# Patient Record
Sex: MALE | Race: WHITE | Employment: OTHER | URBAN - METROPOLITAN AREA
[De-identification: names, ages, dates, MRNs, and addresses within clinical notes are randomized per-mention and may not be internally consistent; named-entity substitution may affect disease eponyms.]

---

## 2017-01-17 RX ORDER — PANTOPRAZOLE SODIUM 40 MG/1
TABLET, DELAYED RELEASE ORAL
Qty: 90 TABLET | Refills: 0 | Status: SHIPPED | OUTPATIENT
Start: 2017-01-17 | End: 2017-04-20

## 2017-04-20 RX ORDER — PANTOPRAZOLE SODIUM 40 MG/1
TABLET, DELAYED RELEASE ORAL
Qty: 90 TABLET | Refills: 0 | Status: SHIPPED | OUTPATIENT
Start: 2017-04-20 | End: 2017-07-20

## 2017-04-23 ENCOUNTER — APPOINTMENT (OUTPATIENT)
Dept: GENERAL RADIOLOGY | Facility: HOSPITAL | Age: 72
End: 2017-04-23
Attending: EMERGENCY MEDICINE
Payer: MEDICARE

## 2017-04-23 ENCOUNTER — HOSPITAL ENCOUNTER (OUTPATIENT)
Age: 72
Discharge: ANOTHER HOSPITAL | End: 2017-04-23
Attending: EMERGENCY MEDICINE
Payer: MEDICARE

## 2017-04-23 ENCOUNTER — APPOINTMENT (OUTPATIENT)
Dept: ULTRASOUND IMAGING | Facility: HOSPITAL | Age: 72
End: 2017-04-23
Attending: EMERGENCY MEDICINE
Payer: MEDICARE

## 2017-04-23 ENCOUNTER — HOSPITAL ENCOUNTER (EMERGENCY)
Facility: HOSPITAL | Age: 72
Discharge: HOME | End: 2017-04-23
Attending: EMERGENCY MEDICINE
Payer: MEDICARE

## 2017-04-23 VITALS
TEMPERATURE: 98 F | HEART RATE: 87 BPM | DIASTOLIC BLOOD PRESSURE: 72 MMHG | OXYGEN SATURATION: 99 % | SYSTOLIC BLOOD PRESSURE: 158 MMHG | RESPIRATION RATE: 20 BRPM

## 2017-04-23 VITALS
DIASTOLIC BLOOD PRESSURE: 74 MMHG | OXYGEN SATURATION: 97 % | BODY MASS INDEX: 24.52 KG/M2 | RESPIRATION RATE: 18 BRPM | TEMPERATURE: 99 F | HEIGHT: 73 IN | WEIGHT: 185 LBS | HEART RATE: 73 BPM | SYSTOLIC BLOOD PRESSURE: 140 MMHG

## 2017-04-23 DIAGNOSIS — R10.13 ABDOMINAL PAIN, EPIGASTRIC: Primary | ICD-10-CM

## 2017-04-23 PROCEDURE — 84484 ASSAY OF TROPONIN QUANT: CPT | Performed by: EMERGENCY MEDICINE

## 2017-04-23 PROCEDURE — 76705 ECHO EXAM OF ABDOMEN: CPT

## 2017-04-23 PROCEDURE — 83690 ASSAY OF LIPASE: CPT | Performed by: EMERGENCY MEDICINE

## 2017-04-23 PROCEDURE — 80076 HEPATIC FUNCTION PANEL: CPT | Performed by: EMERGENCY MEDICINE

## 2017-04-23 PROCEDURE — 85025 COMPLETE CBC W/AUTO DIFF WBC: CPT | Performed by: EMERGENCY MEDICINE

## 2017-04-23 PROCEDURE — 99215 OFFICE O/P EST HI 40 MIN: CPT

## 2017-04-23 PROCEDURE — 36415 COLL VENOUS BLD VENIPUNCTURE: CPT

## 2017-04-23 PROCEDURE — 93005 ELECTROCARDIOGRAM TRACING: CPT

## 2017-04-23 PROCEDURE — 71010 XR CHEST AP PORTABLE  (CPT=71010): CPT

## 2017-04-23 PROCEDURE — 93010 ELECTROCARDIOGRAM REPORT: CPT | Performed by: EMERGENCY MEDICINE

## 2017-04-23 PROCEDURE — 99285 EMERGENCY DEPT VISIT HI MDM: CPT

## 2017-04-23 PROCEDURE — 81003 URINALYSIS AUTO W/O SCOPE: CPT | Performed by: EMERGENCY MEDICINE

## 2017-04-23 PROCEDURE — 80048 BASIC METABOLIC PNL TOTAL CA: CPT | Performed by: EMERGENCY MEDICINE

## 2017-04-23 PROCEDURE — 81002 URINALYSIS NONAUTO W/O SCOPE: CPT

## 2017-04-23 RX ORDER — RANITIDINE 150 MG/1
150 TABLET ORAL EVERY 12 HOURS
Qty: 60 TABLET | Refills: 0 | Status: SHIPPED | OUTPATIENT
Start: 2017-04-23 | End: 2017-05-03

## 2017-04-23 RX ORDER — MAGNESIUM HYDROXIDE/ALUMINUM HYDROXICE/SIMETHICONE 120; 1200; 1200 MG/30ML; MG/30ML; MG/30ML
30 SUSPENSION ORAL ONCE
Status: COMPLETED | OUTPATIENT
Start: 2017-04-23 | End: 2017-04-23

## 2017-04-23 RX ORDER — HEPARIN SODIUM (PORCINE) LOCK FLUSH IV SOLN 100 UNIT/ML 100 UNIT/ML
1 SOLUTION INTRAVENOUS EVERY 8 HOURS PRN
Status: DISCONTINUED | OUTPATIENT
Start: 2017-04-23 | End: 2017-04-23

## 2017-04-23 NOTE — ED PROVIDER NOTES
Patient Seen in: [address]    History   Patient presents with:  Abdomen/Flank Pain (GI/)    Stated Complaint: Abdominal Pain     The history is provided by the patient.      58-year-old male, past medical history signi Tab,  Take by mouth daily. Pantoprazole Sodium (PROTONIX) 40 MG Oral Tab EC,  TAKE 1 TABLET BY MOUTH DAILY   aspirin 325 MG Oral Tab,  Take 325 mg by mouth daily. Vitamin B-1 (VITAMIN B1) 100 MG Oral Tab,  Take 100 mg by mouth daily.    fluorouracil (EF protuberant abdomen, mild distention, exquisitely tender in the epigastric area, minimal right upper quadrant abdominal tenderness, no lower abdominal tenderness. Negative Hardwick's. No palpable masses. Musculoskeletal: Normal range of motion.  He exhibi

## 2017-04-23 NOTE — ED INITIAL ASSESSMENT (HPI)
Pt here with complaints of abdominal pain  That started in the middle of the night today , pt states it gets worse when sitting or lying down, pt states\" he feels like he has a softball in his stomach\"

## 2017-04-24 NOTE — ED PROVIDER NOTES
Patient Seen in: [facility] Emergency Department    History   No chief complaint on file. Stated Complaint: Abdominal Pain    HPI    Patient complains of mid epigastric  abdominal pain that began last night. Pain described as sharp, throbbing. death)   • Diabetes Father    • Heart Disorder Mother      cva   • Stroke Mother 67     cause of death   • Heart Disorder Sister      CAD (cause of death)   • Genito-Urinary Disorder Neg      Urolithiasis         Smoking Status: Never Smoker Ketones Urine Trace (*)     Urobilinogen Urine 2.0 (*)     All other components within normal limits   BASIC METABOLIC PANEL (8) - Abnormal; Notable for the following:     Glucose 111 (*)     All other components within normal limits   HEPATIC FUNCTION PAN mm.  Echogenic liver consistent with fatty infiltration. Right kidney unremarkable. Limited visualization pancreas due to bowel gas. patietn feeling better would like to go home will fu with primary or return to ER if worse.           Disposition and P

## 2017-04-25 ENCOUNTER — TELEPHONE (OUTPATIENT)
Dept: INTERNAL MEDICINE CLINIC | Facility: CLINIC | Age: 72
End: 2017-04-25

## 2017-04-25 NOTE — TELEPHONE ENCOUNTER
Pt was in the ER on Sunday and they told him to follow up with Dr. Damian aHyes within a week, no appts available, declined other doctor, please advise

## 2017-05-01 ENCOUNTER — OFFICE VISIT (OUTPATIENT)
Dept: INTERNAL MEDICINE CLINIC | Facility: CLINIC | Age: 72
End: 2017-05-01

## 2017-05-01 VITALS
HEIGHT: 71 IN | DIASTOLIC BLOOD PRESSURE: 78 MMHG | HEART RATE: 82 BPM | BODY MASS INDEX: 27.18 KG/M2 | TEMPERATURE: 99 F | WEIGHT: 194.13 LBS | RESPIRATION RATE: 20 BRPM | SYSTOLIC BLOOD PRESSURE: 132 MMHG

## 2017-05-01 DIAGNOSIS — E78.2 MIXED HYPERLIPIDEMIA: ICD-10-CM

## 2017-05-01 DIAGNOSIS — I10 ESSENTIAL HYPERTENSION: Primary | ICD-10-CM

## 2017-05-01 DIAGNOSIS — F10.10 ALCOHOL ABUSE: ICD-10-CM

## 2017-05-01 DIAGNOSIS — R10.13 EPIGASTRIC PAIN: ICD-10-CM

## 2017-05-01 PROCEDURE — G0463 HOSPITAL OUTPT CLINIC VISIT: HCPCS | Performed by: INTERNAL MEDICINE

## 2017-05-01 PROCEDURE — 99214 OFFICE O/P EST MOD 30 MIN: CPT | Performed by: INTERNAL MEDICINE

## 2017-05-01 RX ORDER — PANTOPRAZOLE SODIUM 20 MG/1
20 TABLET, DELAYED RELEASE ORAL
COMMUNITY
End: 2017-05-01

## 2017-05-01 RX ORDER — FOLIC ACID 1 MG/1
1 TABLET ORAL
COMMUNITY
End: 2017-05-01

## 2017-05-01 RX ORDER — METOPROLOL SUCCINATE 50 MG/1
50 TABLET, EXTENDED RELEASE ORAL
COMMUNITY
End: 2017-05-01

## 2017-05-01 RX ORDER — DOCUSATE SODIUM 100 MG/1
100 CAPSULE, LIQUID FILLED ORAL
COMMUNITY
End: 2017-11-14

## 2017-05-01 RX ORDER — FERROUS SULFATE 325(65) MG
325 TABLET ORAL
COMMUNITY
End: 2017-11-14

## 2017-05-01 RX ORDER — MELATONIN
100
COMMUNITY
End: 2017-05-01

## 2017-05-01 RX ORDER — ATORVASTATIN CALCIUM 80 MG/1
80 TABLET, FILM COATED ORAL
COMMUNITY
End: 2017-05-01

## 2017-05-01 RX ORDER — FUROSEMIDE 20 MG/1
20 TABLET ORAL
COMMUNITY
End: 2018-04-11

## 2017-05-02 NOTE — PROGRESS NOTES
Fredy Hickman is a 66year old male. HPI:   1. Essential hypertension    Patient has been following low salt diet and has been taking anti-hypertensive prescriptions as prescribed.  Blood pressure has been checked and is under good control at home with fluorouracil (EFUDEX) 5 % Apply Externally Cream  Disp:  Rfl: 0   furosemide (LASIX) 20 MG Oral Tab Take 20 mg by mouth. Disp:  Rfl:    Ferrous Sulfate 325 (65 Fe) MG Oral Tab Take 325 mg by mouth.  Disp:  Rfl:    docusate sodium (COLACE) 100 MG Oral Cap PLAN:   1. Essential hypertension    Patient instructed to take anti-hypertensive medicines exactly as prescribed and to follow a low salt diet as discussed.  Regular exercise at least 3 times weekly will help to curb one's appetite, control weight and lead

## 2017-06-08 ENCOUNTER — LAB ENCOUNTER (OUTPATIENT)
Dept: LAB | Facility: HOSPITAL | Age: 72
End: 2017-06-08
Attending: UROLOGY
Payer: MEDICARE

## 2017-06-08 DIAGNOSIS — R80.9 PROTEINURIA, UNSPECIFIED TYPE: ICD-10-CM

## 2017-06-08 DIAGNOSIS — R97.20 ELEVATED PSA: ICD-10-CM

## 2017-06-08 PROCEDURE — 84154 ASSAY OF PSA FREE: CPT

## 2017-06-08 PROCEDURE — 84153 ASSAY OF PSA TOTAL: CPT

## 2017-06-08 PROCEDURE — 81001 URINALYSIS AUTO W/SCOPE: CPT

## 2017-06-08 PROCEDURE — 36415 COLL VENOUS BLD VENIPUNCTURE: CPT

## 2017-06-12 ENCOUNTER — OFFICE VISIT (OUTPATIENT)
Dept: SURGERY | Facility: CLINIC | Age: 72
End: 2017-06-12

## 2017-06-12 VITALS
HEART RATE: 81 BPM | WEIGHT: 190 LBS | TEMPERATURE: 98 F | BODY MASS INDEX: 26.6 KG/M2 | HEIGHT: 71 IN | SYSTOLIC BLOOD PRESSURE: 118 MMHG | DIASTOLIC BLOOD PRESSURE: 62 MMHG

## 2017-06-12 DIAGNOSIS — R97.20 ELEVATED PSA: Primary | ICD-10-CM

## 2017-06-12 DIAGNOSIS — R35.1 NOCTURIA: ICD-10-CM

## 2017-06-12 DIAGNOSIS — N40.1 BENIGN NON-NODULAR PROSTATIC HYPERPLASIA WITH LOWER URINARY TRACT SYMPTOMS: ICD-10-CM

## 2017-06-12 PROCEDURE — 99214 OFFICE O/P EST MOD 30 MIN: CPT | Performed by: UROLOGY

## 2017-06-12 PROCEDURE — G0463 HOSPITAL OUTPT CLINIC VISIT: HCPCS | Performed by: UROLOGY

## 2017-06-12 NOTE — PATIENT INSTRUCTIONS
Visit in 1 year. Blood draw for PSA--total and free and complete urinalysis urine test before visit.   Please abstain from any and all sexual activity for 5 days before the PSA blood test.

## 2017-06-12 NOTE — PROGRESS NOTES
HPI:    Patient ID: Kristyn Bond is a 66year old male. HPI     1. Nocturia  Patient's AUA score today was 7, mild voiding dysfunction category worse than AUA 4 on chart review (12/12/2016 ).   The patient has urinary frequency less than every 2 hour quickly -- I advised that he should see his PCP, Dr. Diane Thomas if he feels this condition is worsening. Respiratory: Negative for shortness of breath. Cardiovascular: Negative for chest pain. Gastrointestinal: Negative for constipation.    Neurologi TABLET BY MOUTH EVERY DAY Disp: 90 tablet Rfl: 1   LISINOPRIL 10 MG Oral Tab TAKE 1 TABLET(10 MG) BY MOUTH EVERY DAY Disp: 90 tablet Rfl: 1   Atorvastatin Calcium 80 MG Oral Tab Take 1 tablet (80 mg total) by mouth daily.  Disp: 90 tablet Rfl: 3   folic aci improving (most recently 4.1, and was 4.9 about 6-7 months ago). Patient inquired if he needed to continue to monitor this problem and I discussed AUA guidelines for males his age.  I explained that the [address] recommends no annual PSA Karlene Kelly MD.   Electronically Signed: Vic Boyce, 6/12/2017, 12:20 PM.    Carrie Park MD,  personally performed the services described in this documentation.  All medical record entries made by the scribe were at my direction and in my presen

## 2017-06-16 ENCOUNTER — OFFICE VISIT (OUTPATIENT)
Dept: AUDIOLOGY | Facility: CLINIC | Age: 72
End: 2017-06-16

## 2017-06-16 DIAGNOSIS — H90.3 SENSORINEURAL HEARING LOSS, BILATERAL: Primary | ICD-10-CM

## 2017-06-16 PROCEDURE — 92593 HEARING AID CHECK, BOTH EARS: CPT | Performed by: AUDIOLOGIST

## 2017-06-16 NOTE — PROGRESS NOTES
HEARING AID FOLLOW-UP    Woo Barbosa  7/31/1945  [id number]      Pt was seen for a six months follow-up. Aids were in need of cleaning/maintenance. Cleaned and suctioned aids and cShells. Changed Smart Guards.   Completed sanitizing and drying c

## 2017-06-21 ENCOUNTER — OFFICE VISIT (OUTPATIENT)
Dept: AUDIOLOGY | Facility: CLINIC | Age: 72
End: 2017-06-21

## 2017-06-21 DIAGNOSIS — H90.3 SENSORINEURAL HEARING LOSS, BILATERAL: Primary | ICD-10-CM

## 2017-06-21 PROCEDURE — 92593 HEARING AID CHECK, BOTH EARS: CPT | Performed by: AUDIOLOGIST

## 2017-06-21 NOTE — PROGRESS NOTES
HEARING AID FOLLOW-UP    Joe Rodriguez  7/31/1945  [id number]    Patient is here for the [employer] Bundle. Unit was fully charged, paired with each other. Not paired to hearing aids.   When attempting to demonstrate the device using clinic TV, pt c

## 2017-07-06 ENCOUNTER — OFFICE VISIT (OUTPATIENT)
Dept: AUDIOLOGY | Facility: CLINIC | Age: 72
End: 2017-07-06

## 2017-07-06 DIAGNOSIS — H90.3 SENSORINEURAL HEARING LOSS, BILATERAL: Primary | ICD-10-CM

## 2017-07-06 PROCEDURE — V5270 ALD, TV AMPLIFIER, ANY TYPE: HCPCS | Performed by: AUDIOLOGIST

## 2017-07-06 PROCEDURE — 92593 HEARING AID CHECK, BOTH EARS: CPT | Performed by: AUDIOLOGIST

## 2017-07-06 RX ORDER — LISINOPRIL 10 MG/1
TABLET ORAL
Qty: 90 TABLET | Refills: 0 | Status: SHIPPED | OUTPATIENT
Start: 2017-07-06 | End: 2017-10-03

## 2017-07-06 NOTE — PROGRESS NOTES
HEARING AID FOLLOW-UP    Amy Fuentes  7/31/1945  [id number]      Pt was accompanied by his wife. Reviewed hearing aid settings and T-coil function. Dispensed [employer] Bundle. Successfully demonstrated its use.   SN:  [id number]  ERUMKLAFIG

## 2017-07-20 RX ORDER — METOPROLOL SUCCINATE 50 MG/1
TABLET, EXTENDED RELEASE ORAL
Qty: 90 TABLET | Refills: 0 | Status: SHIPPED | OUTPATIENT
Start: 2017-07-20 | End: 2017-10-17

## 2017-07-21 RX ORDER — PANTOPRAZOLE SODIUM 40 MG/1
TABLET, DELAYED RELEASE ORAL
Qty: 90 TABLET | Refills: 0 | Status: SHIPPED | OUTPATIENT
Start: 2017-07-21 | End: 2017-10-17

## 2017-07-26 ENCOUNTER — HOSPITAL ENCOUNTER (OUTPATIENT)
Age: 72
Discharge: HOME | End: 2017-07-26
Attending: FAMILY MEDICINE
Payer: MEDICARE

## 2017-07-26 VITALS
DIASTOLIC BLOOD PRESSURE: 63 MMHG | TEMPERATURE: 99 F | RESPIRATION RATE: 14 BRPM | OXYGEN SATURATION: 98 % | SYSTOLIC BLOOD PRESSURE: 125 MMHG | HEART RATE: 87 BPM

## 2017-07-26 DIAGNOSIS — M70.22 OLECRANON BURSITIS OF LEFT ELBOW: Primary | ICD-10-CM

## 2017-07-26 PROCEDURE — 99212 OFFICE O/P EST SF 10 MIN: CPT

## 2017-07-26 NOTE — ED INITIAL ASSESSMENT (HPI)
Pt here with complaints of a cyst to the left elbow that developed 2 weeks , pt denies any trauma or repetative movements done to that left arm, pt denies any pain or fevers, cyst on left elbow is about a \"golf ball\" size

## 2017-07-26 NOTE — ED PROVIDER NOTES
Patient Seen in: [address]    History   Patient presents with:  Cyst    Stated Complaint: Cyst on left elbow    HPI  67-year-old male presents with 2 weeks of swelling to the left elbow. No injury or trauma. No pain. MOUTH DAILY   furosemide (LASIX) 20 MG Oral Tab,  Take 20 mg by mouth. Ferrous Sulfate 325 (65 Fe) MG Oral Tab,  Take 325 mg by mouth. docusate sodium (COLACE) 100 MG Oral Cap,  Take 100 mg by mouth.    fluorouracil (EFUDEX) 5 % Apply Externally Cream, noted.   Neurological: He is alert and oriented to person, place, and time. Skin: Skin is warm. No rash noted. He is not diaphoretic. No erythema. No pallor. Nursing note and vitals reviewed. MDM     Discussed NSAIDs and compression.   Given patien

## 2017-08-30 ENCOUNTER — TELEPHONE (OUTPATIENT)
Dept: INTERNAL MEDICINE CLINIC | Facility: CLINIC | Age: 72
End: 2017-08-30

## 2017-08-30 NOTE — TELEPHONE ENCOUNTER
Spoke with pt. It is a personal matter regarding his daughter, not pertaining to his medical record. Will pm HADLEYV.

## 2017-09-21 ENCOUNTER — HOSPITAL ENCOUNTER (OUTPATIENT)
Age: 72
Discharge: HOME | End: 2017-09-21
Payer: MEDICARE

## 2017-09-21 VITALS
TEMPERATURE: 99 F | RESPIRATION RATE: 18 BRPM | SYSTOLIC BLOOD PRESSURE: 126 MMHG | HEART RATE: 79 BPM | OXYGEN SATURATION: 98 % | BODY MASS INDEX: 24.5 KG/M2 | DIASTOLIC BLOOD PRESSURE: 72 MMHG | WEIGHT: 175 LBS | HEIGHT: 71 IN

## 2017-09-21 DIAGNOSIS — J02.0 STREP PHARYNGITIS: Primary | ICD-10-CM

## 2017-09-21 DIAGNOSIS — H10.9 CONJUNCTIVITIS OF RIGHT EYE, UNSPECIFIED CONJUNCTIVITIS TYPE: ICD-10-CM

## 2017-09-21 LAB — S PYO AG THROAT QL: POSITIVE

## 2017-09-21 PROCEDURE — 99213 OFFICE O/P EST LOW 20 MIN: CPT

## 2017-09-21 PROCEDURE — 99214 OFFICE O/P EST MOD 30 MIN: CPT

## 2017-09-21 PROCEDURE — 87430 STREP A AG IA: CPT

## 2017-09-21 RX ORDER — AZITHROMYCIN 250 MG/1
TABLET, FILM COATED ORAL
Qty: 1 PACKAGE | Refills: 0 | Status: SHIPPED | OUTPATIENT
Start: 2017-09-21 | End: 2017-09-26

## 2017-09-21 RX ORDER — TOBRAMYCIN 3 MG/ML
2 SOLUTION/ DROPS OPHTHALMIC
Qty: 1 BOTTLE | Refills: 0 | Status: SHIPPED | OUTPATIENT
Start: 2017-09-21 | End: 2017-09-26

## 2017-09-21 NOTE — ED NOTES
All orders complete pt leaving IC stable no acute distress noted RX given and explained pt verbalizes DC and follow up instructions.

## 2017-09-21 NOTE — ED PROVIDER NOTES
Patient Seen in: [address]    History   Patient presents with:  Cough/URI    Stated Complaint: SORE THROAT, RUNNING NOSE, EAR ACHE, HEADACHE    HPI    Patient here with sore throat for  1 week.   No travel,no sick contac acid 1 MG Oral Tab,  Take by mouth daily. Pantoprazole Sodium (PROTONIX) 40 MG Oral Tab EC,  TAKE 1 TABLET BY MOUTH DAILY   aspirin 325 MG Oral Tab,  Take 325 mg by mouth daily. Vitamin B-1 (VITAMIN B1) 100 MG Oral Tab,  Take 100 mg by mouth daily.    f vs. uri    ED Course     Labs Reviewed   [facility] POCT RAPID STREP - Abnormal; Notable for the following:        Result Value    POCT Rapid Strep Positive (*)     All other components within normal limits       MDM     Pt is a 66 yo with strep pharyngitis and R

## 2017-09-21 NOTE — ED NOTES
Redness to right eye also noted pt reports woke up this AM with discharge and crust to eye. Denies visual disturbances.

## 2017-09-21 NOTE — ED INITIAL ASSESSMENT (HPI)
Per pt having cough runny nose and headache for one week. Reports right ear pain for 3 days. Also reports sore throat for one week. Subjective fever.

## 2017-09-27 ENCOUNTER — PATIENT MESSAGE (OUTPATIENT)
Dept: INTERNAL MEDICINE CLINIC | Facility: CLINIC | Age: 72
End: 2017-09-27

## 2017-09-27 NOTE — TELEPHONE ENCOUNTER
From: Megha   To: Mely Marino MD  Sent: 9/27/2017 4:08 PM CDT  Subject: Non-Urgent Medical Question    Hi Dr. Phyllis Tolentino,   My daughter typed up this information regarding my bloody eye and recent cold. Pls let me know your thoughts.  (All

## 2017-09-28 ENCOUNTER — TELEPHONE (OUTPATIENT)
Dept: OTHER | Age: 72
End: 2017-09-28

## 2017-09-28 NOTE — TELEPHONE ENCOUNTER
Trae,  I should see you for these complaints.   Dr NICK    ----- Message -----     From: Sharita Blanco     Sent: 9/27/2017  4:08 PM CDT       To: Juan Borrero MD  Subject: Non-Urgent Medical Question    Hi Dr. Gabriella Aguilar,   My daughter typed up

## 2017-10-04 RX ORDER — LISINOPRIL 10 MG/1
TABLET ORAL
Qty: 90 TABLET | Refills: 0 | Status: SHIPPED | OUTPATIENT
Start: 2017-10-04 | End: 2017-11-14

## 2017-10-04 RX ORDER — ATORVASTATIN CALCIUM 80 MG/1
TABLET, FILM COATED ORAL
Qty: 90 TABLET | Refills: 0 | Status: SHIPPED | OUTPATIENT
Start: 2017-10-04 | End: 2017-11-14

## 2017-10-04 NOTE — TELEPHONE ENCOUNTER
Hypertensive Medications  Protocol Criteria:  · Appointment scheduled in the past 6 months or in the next 3 months  · BMP or CMP in the past 12 months  · Creatinine result < 2  Recent Outpatient Visits            3 months ago Sensorineural hearing loss, bi months  · ALT result < 80  · LDL result <130   Recent Outpatient Visits            3 months ago Sensorineural hearing loss, bilateral    [facility] Angelica Jacobs    Office Visit    3 months ago Sensorineural hearing l

## 2017-10-06 ENCOUNTER — TELEPHONE (OUTPATIENT)
Dept: INTERNAL MEDICINE CLINIC | Facility: CLINIC | Age: 72
End: 2017-10-06

## 2017-10-13 ENCOUNTER — PATIENT OUTREACH (OUTPATIENT)
Dept: INTERNAL MEDICINE CLINIC | Facility: CLINIC | Age: 72
End: 2017-10-13

## 2017-10-17 RX ORDER — PANTOPRAZOLE SODIUM 40 MG/1
TABLET, DELAYED RELEASE ORAL
Qty: 90 TABLET | Refills: 0 | Status: SHIPPED | OUTPATIENT
Start: 2017-10-17 | End: 2017-11-14

## 2017-10-18 ENCOUNTER — OFFICE VISIT (OUTPATIENT)
Dept: INTERNAL MEDICINE CLINIC | Facility: CLINIC | Age: 72
End: 2017-10-18

## 2017-10-18 VITALS
DIASTOLIC BLOOD PRESSURE: 89 MMHG | BODY MASS INDEX: 25.06 KG/M2 | WEIGHT: 179 LBS | RESPIRATION RATE: 16 BRPM | HEART RATE: 68 BPM | HEIGHT: 71 IN | SYSTOLIC BLOOD PRESSURE: 138 MMHG

## 2017-10-18 DIAGNOSIS — IMO0001 COLD: Primary | ICD-10-CM

## 2017-10-18 DIAGNOSIS — F10.10 ALCOHOL ABUSE: ICD-10-CM

## 2017-10-18 DIAGNOSIS — E78.2 MIXED HYPERLIPIDEMIA: ICD-10-CM

## 2017-10-18 DIAGNOSIS — H61.21 IMPACTED CERUMEN OF RIGHT EAR: ICD-10-CM

## 2017-10-18 DIAGNOSIS — H69.81 EUSTACHIAN TUBE DYSFUNCTION, RIGHT: ICD-10-CM

## 2017-10-18 PROBLEM — H69.91 EUSTACHIAN TUBE DYSFUNCTION, RIGHT: Status: ACTIVE | Noted: 2017-10-18

## 2017-10-18 PROCEDURE — 99214 OFFICE O/P EST MOD 30 MIN: CPT | Performed by: INTERNAL MEDICINE

## 2017-10-18 PROCEDURE — 69210 REMOVE IMPACTED EAR WAX UNI: CPT | Performed by: INTERNAL MEDICINE

## 2017-10-18 RX ORDER — METHYLPREDNISOLONE 4 MG/1
TABLET ORAL
Qty: 1 KIT | Refills: 0 | Status: SHIPPED | OUTPATIENT
Start: 2017-10-18 | End: 2017-11-14

## 2017-10-18 RX ORDER — METOPROLOL SUCCINATE 50 MG/1
TABLET, EXTENDED RELEASE ORAL
Qty: 90 TABLET | Refills: 0 | Status: SHIPPED | OUTPATIENT
Start: 2017-10-18 | End: 2017-11-14

## 2017-10-18 NOTE — PROGRESS NOTES
Joe Rodriguez is a 66year old male. HPI:   1. Cold    Had a cough and cold 6 weeks ago and was STARTED ON Z karishma per immediate care. Since then is better but has continued hearing loss on the right side.  No further nose running and throat better with n Disp:  Rfl: 0   furosemide (LASIX) 20 MG Oral Tab Take 20 mg by mouth. Disp:  Rfl:    Ferrous Sulfate 325 (65 Fe) MG Oral Tab Take 325 mg by mouth. Disp:  Rfl:    docusate sodium (COLACE) 100 MG Oral Cap Take 100 mg by mouth.  Disp:  Rfl:       Past Medical instructed to take anti-hypertensive medicines exactly as prescribed and to follow a low salt diet as discussed. Regular exercise at least 3 times weekly will help to curb one's appetite, control weight and lead to better blood pressure control.     2. Ale

## 2017-10-19 ENCOUNTER — PATIENT OUTREACH (OUTPATIENT)
Dept: INTERNAL MEDICINE CLINIC | Facility: CLINIC | Age: 72
End: 2017-10-19

## 2017-10-26 ENCOUNTER — PATIENT OUTREACH (OUTPATIENT)
Dept: INTERNAL MEDICINE CLINIC | Facility: CLINIC | Age: 72
End: 2017-10-26

## 2017-10-26 NOTE — PROGRESS NOTES
Outreached to patient in regards to CCM program, patient states is currently unavailable to talk,  I offered to call back tomorrow and patient agreered. Will follow up tomorrow.

## 2017-10-27 ENCOUNTER — PATIENT OUTREACH (OUTPATIENT)
Dept: INTERNAL MEDICINE CLINIC | Facility: CLINIC | Age: 72
End: 2017-10-27

## 2017-10-27 NOTE — PROGRESS NOTES
Outreached to patient in regards to CCM program did not have time to talk about the program offered to call at another time patient states can you mail me some information. Will send letter and will follow up in 2 weeks.

## 2017-11-14 ENCOUNTER — PATIENT OUTREACH (OUTPATIENT)
Dept: INTERNAL MEDICINE CLINIC | Facility: CLINIC | Age: 72
End: 2017-11-14

## 2017-11-14 NOTE — PROGRESS NOTES
Outreached to patient in follow up to [facility] program and letter sent. Left message for patient to return my call [phone].

## 2017-11-22 ENCOUNTER — TELEPHONE (OUTPATIENT)
Dept: INTERNAL MEDICINE CLINIC | Facility: CLINIC | Age: 72
End: 2017-11-22

## 2017-11-22 DIAGNOSIS — H91.90 HEARING DIFFICULTY, UNSPECIFIED LATERALITY: Primary | ICD-10-CM

## 2017-12-11 ENCOUNTER — OFFICE VISIT (OUTPATIENT)
Dept: OTOLARYNGOLOGY | Facility: CLINIC | Age: 72
End: 2017-12-11

## 2017-12-11 ENCOUNTER — OFFICE VISIT (OUTPATIENT)
Dept: AUDIOLOGY | Facility: CLINIC | Age: 72
End: 2017-12-11

## 2017-12-11 VITALS
DIASTOLIC BLOOD PRESSURE: 64 MMHG | TEMPERATURE: 99 F | RESPIRATION RATE: 18 BRPM | HEIGHT: 71 IN | BODY MASS INDEX: 25.34 KG/M2 | WEIGHT: 181 LBS | SYSTOLIC BLOOD PRESSURE: 122 MMHG

## 2017-12-11 DIAGNOSIS — H90.A31 MIXED CONDUCTIVE AND SENSORINEURAL HEARING LOSS OF RIGHT EAR WITH RESTRICTED HEARING OF LEFT EAR: Primary | ICD-10-CM

## 2017-12-11 DIAGNOSIS — H90.3 SNHL (SENSORY-NEURAL HEARING LOSS), ASYMMETRICAL: Primary | ICD-10-CM

## 2017-12-11 DIAGNOSIS — H61.20 WAX IN EAR: ICD-10-CM

## 2017-12-11 PROCEDURE — 92567 TYMPANOMETRY: CPT | Performed by: AUDIOLOGIST

## 2017-12-11 PROCEDURE — 99213 OFFICE O/P EST LOW 20 MIN: CPT | Performed by: OTOLARYNGOLOGY

## 2017-12-11 PROCEDURE — 92557 COMPREHENSIVE HEARING TEST: CPT | Performed by: AUDIOLOGIST

## 2017-12-11 PROCEDURE — G0268 REMOVAL OF IMPACTED WAX MD: HCPCS | Performed by: OTOLARYNGOLOGY

## 2017-12-11 RX ORDER — METHYLPREDNISOLONE 4 MG/1
TABLET ORAL
Qty: 1 PACKAGE | Refills: 0 | Status: SHIPPED | OUTPATIENT
Start: 2017-12-11 | End: 2018-04-11

## 2017-12-11 NOTE — PROGRESS NOTES
AUDIOGRAM     Joselin Dueñas was referred for testing by Izabela Chavez. 7/31/1945  [id number]    Pt was seen due to a change in right hearing following a cold.   Previous test results, obtained on 6/16/17 showed Thresholds decreased slightly comp

## 2017-12-11 NOTE — PROGRESS NOTES
Ebenezer Antonio is a 67year old male. Patient presents with:  Hearing Loss: Patient present for right ear hearing loss for past 4 months. Pt has ear wax to left ear per Audio. Denies pain.          HISTORY OF PRESENT ILLNESS  12/11/2017   Here for evaluat hyperlipidemia     drug therapy   • Perforated ear drum    • Tubular adenoma of colon 5/2016    Repeat in 5/2019   • Unspecified essential hypertension     drug therapy     Past Surgical History:  No date: CABG  No date: HERNIA SURGERY      Comment: stu obtained, the patients ears were examined under the operating microscope. Cerumen impaction was removed from bilateral ears using suction. Tympanic membranes were noted to be normal. Patient tolerated the procedure well. All questions were answered. this medication.      - OP REFERRAL TO AUDIOLOGY    2.  Wax in ear     - REMOVAL IMPACTED CERUMEN REQUIRING INSTRUMENTATION, UNILATERAL      Leroy Andrade MD    12/11/2017    2:18 PM

## 2018-01-15 RX ORDER — PANTOPRAZOLE SODIUM 40 MG/1
TABLET, DELAYED RELEASE ORAL
Qty: 90 TABLET | Refills: 0 | Status: SHIPPED | OUTPATIENT
Start: 2018-01-15 | End: 2018-04-11

## 2018-03-15 ENCOUNTER — OFFICE VISIT (OUTPATIENT)
Dept: AUDIOLOGY | Facility: CLINIC | Age: 73
End: 2018-03-15

## 2018-03-15 DIAGNOSIS — H90.3 SENSORINEURAL HEARING LOSS, BILATERAL: Primary | ICD-10-CM

## 2018-03-15 PROCEDURE — 92593 HEARING AID CHECK, BOTH EARS: CPT | Performed by: AUDIOLOGIST

## 2018-03-15 NOTE — PROGRESS NOTES
HEARING AID FOLLOW-UP    Jacinda Cai  7/31/1945  [id number]    PT noted he cannot hear from the right ear. Hearing test demonstrated stable hearing for each ear compared to audio of 12/11/17.   Reprogrammed hearing aids using today's [employer]

## 2018-04-11 ENCOUNTER — APPOINTMENT (OUTPATIENT)
Dept: LAB | Age: 73
End: 2018-04-11
Attending: INTERNAL MEDICINE
Payer: MEDICARE

## 2018-04-11 ENCOUNTER — HOSPITAL ENCOUNTER (OUTPATIENT)
Dept: GENERAL RADIOLOGY | Facility: HOSPITAL | Age: 73
Discharge: HOME | End: 2018-04-11
Attending: INTERNAL MEDICINE | Admitting: INTERNAL MEDICINE
Payer: MEDICARE

## 2018-04-11 ENCOUNTER — OFFICE VISIT (OUTPATIENT)
Dept: INTERNAL MEDICINE CLINIC | Facility: CLINIC | Age: 73
End: 2018-04-11

## 2018-04-11 VITALS
WEIGHT: 192 LBS | BODY MASS INDEX: 26.88 KG/M2 | RESPIRATION RATE: 16 BRPM | HEART RATE: 72 BPM | HEIGHT: 71 IN | DIASTOLIC BLOOD PRESSURE: 71 MMHG | SYSTOLIC BLOOD PRESSURE: 127 MMHG

## 2018-04-11 DIAGNOSIS — I25.10 CORONARY ARTERY DISEASE INVOLVING NATIVE CORONARY ARTERY OF NATIVE HEART WITHOUT ANGINA PECTORIS: Chronic | ICD-10-CM

## 2018-04-11 DIAGNOSIS — F10.10 ALCOHOL ABUSE: ICD-10-CM

## 2018-04-11 DIAGNOSIS — M79.89 LEG SWELLING: ICD-10-CM

## 2018-04-11 DIAGNOSIS — M10.041 ACUTE IDIOPATHIC GOUT OF RIGHT HAND: ICD-10-CM

## 2018-04-11 DIAGNOSIS — I10 ESSENTIAL HYPERTENSION: ICD-10-CM

## 2018-04-11 DIAGNOSIS — E78.2 MIXED HYPERLIPIDEMIA: ICD-10-CM

## 2018-04-11 DIAGNOSIS — R63.0 LOSS OF APPETITE: ICD-10-CM

## 2018-04-11 DIAGNOSIS — M79.89 LEG SWELLING: Primary | ICD-10-CM

## 2018-04-11 DIAGNOSIS — I10 ESSENTIAL HYPERTENSION WITH GOAL BLOOD PRESSURE LESS THAN 140/90: ICD-10-CM

## 2018-04-11 PROCEDURE — 84443 ASSAY THYROID STIM HORMONE: CPT

## 2018-04-11 PROCEDURE — 93005 ELECTROCARDIOGRAM TRACING: CPT

## 2018-04-11 PROCEDURE — 83880 ASSAY OF NATRIURETIC PEPTIDE: CPT

## 2018-04-11 PROCEDURE — 99215 OFFICE O/P EST HI 40 MIN: CPT | Performed by: INTERNAL MEDICINE

## 2018-04-11 PROCEDURE — 81001 URINALYSIS AUTO W/SCOPE: CPT

## 2018-04-11 PROCEDURE — 36415 COLL VENOUS BLD VENIPUNCTURE: CPT

## 2018-04-11 PROCEDURE — 71046 X-RAY EXAM CHEST 2 VIEWS: CPT | Performed by: INTERNAL MEDICINE

## 2018-04-11 PROCEDURE — 84550 ASSAY OF BLOOD/URIC ACID: CPT

## 2018-04-11 PROCEDURE — G0463 HOSPITAL OUTPT CLINIC VISIT: HCPCS | Performed by: INTERNAL MEDICINE

## 2018-04-11 PROCEDURE — 93010 ELECTROCARDIOGRAM REPORT: CPT | Performed by: INTERNAL MEDICINE

## 2018-04-11 RX ORDER — ALLOPURINOL 100 MG/1
100 TABLET ORAL DAILY
Qty: 90 TABLET | Refills: 3 | Status: SHIPPED | OUTPATIENT
Start: 2018-04-11 | End: 2018-08-13 | Stop reason: ALTCHOICE

## 2018-04-11 RX ORDER — FUROSEMIDE 20 MG/1
40 TABLET ORAL DAILY
Qty: 60 TABLET | Refills: 3 | Status: SHIPPED | OUTPATIENT
Start: 2018-04-11 | End: 2018-04-11

## 2018-04-11 RX ORDER — FOLIC ACID 1 MG/1
1 TABLET ORAL DAILY
Qty: 90 TABLET | Refills: 1 | Status: SHIPPED | OUTPATIENT
Start: 2018-04-11

## 2018-04-11 RX ORDER — POTASSIUM CHLORIDE 750 MG/1
20 TABLET, FILM COATED, EXTENDED RELEASE ORAL DAILY
Qty: 180 TABLET | Refills: 1 | Status: SHIPPED | OUTPATIENT
Start: 2018-04-11 | End: 2018-09-14

## 2018-04-11 RX ORDER — LISINOPRIL 10 MG/1
10 TABLET ORAL
Qty: 90 TABLET | Refills: 3 | Status: SHIPPED | OUTPATIENT
Start: 2018-04-11 | End: 2018-08-20

## 2018-04-11 RX ORDER — METOPROLOL SUCCINATE 50 MG/1
50 TABLET, EXTENDED RELEASE ORAL
Qty: 90 TABLET | Refills: 3 | Status: SHIPPED | OUTPATIENT
Start: 2018-04-11 | End: 2019-05-20

## 2018-04-11 RX ORDER — FUROSEMIDE 20 MG/1
40 TABLET ORAL DAILY
Qty: 60 TABLET | Refills: 3 | Status: SHIPPED | OUTPATIENT
Start: 2018-04-11 | End: 2018-05-02

## 2018-04-11 RX ORDER — PANTOPRAZOLE SODIUM 40 MG/1
40 TABLET, DELAYED RELEASE ORAL
Qty: 90 TABLET | Refills: 1 | Status: SHIPPED | OUTPATIENT
Start: 2018-04-11 | End: 2018-08-20

## 2018-04-11 NOTE — PROGRESS NOTES
Jacinda Cai is a 66year old male. HPI:   1. Swollen feet    Has some swelling in feet that has gone on for a month along with some shortness of breath. Weight is up some.     Wt Readings from Last 6 Encounters:  04/11/18 : 192 lb (87.1 kg)  12/11/17 daily. Disp:  Rfl:    fluorouracil (EFUDEX) 5 % Apply Externally Cream  Disp:  Rfl: 0   furosemide (LASIX) 20 MG Oral Tab Take 20 mg by mouth. Disp:  Rfl:    Ferrous Sulfate 325 (65 Fe) MG Oral Tab Take 325 mg by mouth.  Disp:  Rfl:    docusate sodium (COLA atraumatic, normocephalic, throat is clear  EARS: Right cerumen impaction  NECK: supple,no adenopathy,no bruits  LUNGS: clear to auscultation  CARDIO: RRR without murmur  GI: good BS's,no masses, HSM or tenderness  EXTREMITIES: no cyanosis, clubbing or jacqueline from 4-6 daily Reviewed recent low platelet count and reduced HG with elevated LFTs. Advised to stop drinking but he states\" I'm not sure I'm able\" Discussed stopping ETOH fully in place ofd wife. ADDENDUM:  Called wife. BNP elevated at 317.  Add KCL 2

## 2018-04-12 ENCOUNTER — LAB ENCOUNTER (OUTPATIENT)
Dept: LAB | Facility: HOSPITAL | Age: 73
End: 2018-04-12
Attending: INTERNAL MEDICINE
Payer: MEDICARE

## 2018-04-12 DIAGNOSIS — E78.2 MIXED HYPERLIPIDEMIA: ICD-10-CM

## 2018-04-12 PROCEDURE — 80053 COMPREHEN METABOLIC PANEL: CPT

## 2018-04-12 PROCEDURE — 85025 COMPLETE CBC W/AUTO DIFF WBC: CPT

## 2018-04-12 PROCEDURE — 80061 LIPID PANEL: CPT

## 2018-04-12 PROCEDURE — 36415 COLL VENOUS BLD VENIPUNCTURE: CPT

## 2018-04-23 ENCOUNTER — TELEPHONE (OUTPATIENT)
Dept: OTHER | Age: 73
End: 2018-04-23

## 2018-04-23 NOTE — TELEPHONE ENCOUNTER
Spoke with wife Gloria Vergara (JOSE verified) and she states they are in [address], \"I can't get him out of the chair-I already call the ambulance. He didn't bring his medications with him. I don't know what he is taking.  Can you tell the paramedic what

## 2018-04-30 ENCOUNTER — OFFICE VISIT (OUTPATIENT)
Dept: INTERNAL MEDICINE CLINIC | Facility: CLINIC | Age: 73
End: 2018-04-30

## 2018-04-30 VITALS
BODY MASS INDEX: 24.64 KG/M2 | RESPIRATION RATE: 16 BRPM | WEIGHT: 176 LBS | HEIGHT: 71 IN | HEART RATE: 60 BPM | DIASTOLIC BLOOD PRESSURE: 75 MMHG | SYSTOLIC BLOOD PRESSURE: 115 MMHG

## 2018-04-30 DIAGNOSIS — R26.89 IMBALANCE: ICD-10-CM

## 2018-04-30 DIAGNOSIS — E78.2 MIXED HYPERLIPIDEMIA: ICD-10-CM

## 2018-04-30 DIAGNOSIS — I50.30 DIASTOLIC CONGESTIVE HEART FAILURE, UNSPECIFIED HF CHRONICITY (HCC): Primary | ICD-10-CM

## 2018-04-30 PROBLEM — J18.9 LINGULAR PNEUMONIA: Status: ACTIVE | Noted: 2018-04-30

## 2018-04-30 PROCEDURE — G0463 HOSPITAL OUTPT CLINIC VISIT: HCPCS | Performed by: INTERNAL MEDICINE

## 2018-04-30 PROCEDURE — 99214 OFFICE O/P EST MOD 30 MIN: CPT | Performed by: INTERNAL MEDICINE

## 2018-04-30 PROCEDURE — 1111F DSCHRG MED/CURRENT MED MERGE: CPT | Performed by: INTERNAL MEDICINE

## 2018-04-30 RX ORDER — TAMSULOSIN HYDROCHLORIDE 0.4 MG/1
0.4 CAPSULE ORAL DAILY
Qty: 30 CAPSULE | Refills: 5 | Status: SHIPPED | OUTPATIENT
Start: 2018-04-30 | End: 2018-05-30

## 2018-04-30 NOTE — PROGRESS NOTES
Ebonie Merino is a 66year old male. HPI:   1. Congestive Heart Failure    Has some swelling in feet that has gone on for a month along with some shortness of breath. Weight is up some. Took diuretics and had weight loss. BNP up to 1900 in [place].  H 0   furosemide (LASIX) 20 MG Oral Tab Take 20 mg by mouth. Disp:  Rfl:    Ferrous Sulfate 325 (65 Fe) MG Oral Tab Take 325 mg by mouth. Disp:  Rfl:    docusate sodium (COLACE) 100 MG Oral Cap Take 100 mg by mouth.  Disp:  Rfl:       Past Medical History   D supple,no adenopathy,no bruits  LUNGS: clear to auscultation  CARDIO: RRR without murmur  GI: good BS's,no masses, HSM or tenderness  EXTREMITIES: no cyanosis, clubbing or edema/     ASSESSMENT AND PLAN:   1.  Congestive Heart Failure    Recent Congestive h

## 2018-05-01 ENCOUNTER — MED REC SCAN ONLY (OUTPATIENT)
Dept: INTERNAL MEDICINE CLINIC | Facility: CLINIC | Age: 73
End: 2018-05-01

## 2018-05-02 PROBLEM — I25.700 CORONARY ARTERY DISEASE INVOLVING CORONARY BYPASS GRAFT OF NATIVE HEART WITH UNSTABLE ANGINA PECTORIS (HCC): Status: ACTIVE | Noted: 2018-05-02

## 2018-05-17 ENCOUNTER — TELEPHONE (OUTPATIENT)
Dept: INTERNAL MEDICINE CLINIC | Facility: CLINIC | Age: 73
End: 2018-05-17

## 2018-05-17 NOTE — TELEPHONE ENCOUNTER
Please have patient schedule a follow up visit soon because of some results seen from his [facility] hospital that has been received.

## 2018-05-19 RX ORDER — PANTOPRAZOLE SODIUM 40 MG/1
TABLET, DELAYED RELEASE ORAL
Qty: 90 TABLET | Refills: 0 | Status: SHIPPED | OUTPATIENT
Start: 2018-05-19 | End: 2018-06-11

## 2018-05-19 NOTE — TELEPHONE ENCOUNTER
Gastrointestional Medication Protocol Passed  Refill Protocol Appointment Criteria  · Appointment scheduled in the past 6 months or in the next 3 months  Recent Outpatient Visits            2 weeks ago Dyspnea on exertion    [address] [facility]

## 2018-06-07 ENCOUNTER — LAB ENCOUNTER (OUTPATIENT)
Dept: LAB | Facility: HOSPITAL | Age: 73
End: 2018-06-07
Attending: UROLOGY
Payer: MEDICARE

## 2018-06-07 DIAGNOSIS — R97.20 ELEVATED PSA: ICD-10-CM

## 2018-06-07 DIAGNOSIS — R35.1 NOCTURIA: ICD-10-CM

## 2018-06-07 PROCEDURE — 84153 ASSAY OF PSA TOTAL: CPT

## 2018-06-07 PROCEDURE — 81001 URINALYSIS AUTO W/SCOPE: CPT

## 2018-06-07 PROCEDURE — 36415 COLL VENOUS BLD VENIPUNCTURE: CPT

## 2018-06-07 PROCEDURE — 84154 ASSAY OF PSA FREE: CPT

## 2018-06-11 ENCOUNTER — OFFICE VISIT (OUTPATIENT)
Dept: INTERNAL MEDICINE CLINIC | Facility: CLINIC | Age: 73
End: 2018-06-11

## 2018-06-11 VITALS
DIASTOLIC BLOOD PRESSURE: 68 MMHG | SYSTOLIC BLOOD PRESSURE: 106 MMHG | WEIGHT: 181 LBS | HEIGHT: 71 IN | RESPIRATION RATE: 16 BRPM | BODY MASS INDEX: 25.34 KG/M2 | HEART RATE: 57 BPM

## 2018-06-11 DIAGNOSIS — R59.1 LYMPHADENOPATHY: Primary | ICD-10-CM

## 2018-06-11 DIAGNOSIS — E78.2 MIXED HYPERLIPIDEMIA: ICD-10-CM

## 2018-06-11 DIAGNOSIS — F10.10 ALCOHOL ABUSE: ICD-10-CM

## 2018-06-11 PROCEDURE — 99214 OFFICE O/P EST MOD 30 MIN: CPT | Performed by: INTERNAL MEDICINE

## 2018-06-11 PROCEDURE — G0463 HOSPITAL OUTPT CLINIC VISIT: HCPCS | Performed by: INTERNAL MEDICINE

## 2018-06-11 RX ORDER — TAMSULOSIN HYDROCHLORIDE 0.4 MG/1
CAPSULE ORAL
Refills: 5 | COMMUNITY
Start: 2018-04-30 | End: 2018-08-13 | Stop reason: ALTCHOICE

## 2018-06-11 NOTE — PROGRESS NOTES
Liliam Sanabria is a 66year old male. HPI:     1. Lymphadenopathy    Was found to have enlarged nodes on abdominal CT scan done in [place]. Needs to follow up with oncology to have this evaluated.  Had been called about this previously when records rev Take 20 mg by mouth. Disp:  Rfl:    Ferrous Sulfate 325 (65 Fe) MG Oral Tab Take 325 mg by mouth. Disp:  Rfl:    docusate sodium (COLACE) 100 MG Oral Cap Take 100 mg by mouth.  Disp:  Rfl:       Past Medical History   Diagnosis Date   • Coronary artery dise clear to auscultation  CARDIO: RRR without murmur  GI: good BS's,no masses, HSM or tenderness  EXTREMITIES: no cyanosis, clubbing or edema/     ASSESSMENT AND PLAN:   1.  Lymphadenopathy    Has several abdominal lymph nodes seen on CT done a few months ago

## 2018-06-12 ENCOUNTER — OFFICE VISIT (OUTPATIENT)
Dept: SURGERY | Facility: CLINIC | Age: 73
End: 2018-06-12

## 2018-06-12 VITALS
HEIGHT: 71 IN | WEIGHT: 171 LBS | SYSTOLIC BLOOD PRESSURE: 114 MMHG | DIASTOLIC BLOOD PRESSURE: 60 MMHG | BODY MASS INDEX: 23.94 KG/M2

## 2018-06-12 DIAGNOSIS — R97.20 ELEVATED PSA: Primary | ICD-10-CM

## 2018-06-12 DIAGNOSIS — N40.1 BENIGN PROSTATIC HYPERPLASIA WITH URINARY FREQUENCY: ICD-10-CM

## 2018-06-12 DIAGNOSIS — R35.0 BENIGN PROSTATIC HYPERPLASIA WITH URINARY FREQUENCY: ICD-10-CM

## 2018-06-12 DIAGNOSIS — R35.1 NOCTURIA: ICD-10-CM

## 2018-06-12 PROCEDURE — G0463 HOSPITAL OUTPT CLINIC VISIT: HCPCS | Performed by: UROLOGY

## 2018-06-12 PROCEDURE — 99214 OFFICE O/P EST MOD 30 MIN: CPT | Performed by: UROLOGY

## 2018-06-12 NOTE — PATIENT INSTRUCTIONS
1.  Important to take tamsulosin 0.4 mg every day to increase size of the bladder neck and diameter of the prostatic urethra so you can urinate more effectively.   Please purchase a pillbox that has separate boxes for Monday through Sunday so you do not for so there would be less need to drink liquids at bedtime.

## 2018-06-12 NOTE — PROGRESS NOTES
HPI:    Patient ID: Ibeth Wilkes is a 67year old male. HPI     1. Nocturia  Chronic. Patient's AUA score today was 14, moderate voiding dysfunction category worse than AUA 7, mild on chart review (6/12/17).   The patient has urinary frequency less t Gastrointestinal: Negative for blood in stool, constipation, diarrhea and vomiting. Genitourinary: Negative for dysuria and hematuria (gross). Neurological: Negative for speech difficulty.         HISTORY:  Past Medical History:   Diagnosis Date   • A Pantoprazole Sodium 40 MG Oral Tab EC Take 1 tablet (40 mg total) by mouth once daily. Disp: 90 tablet Rfl: 1   lisinopril 10 MG Oral Tab Take 1 tablet (10 mg total) by mouth once daily.  Disp: 90 tablet Rfl: 3   folic acid 1 MG Oral Tab Take 1 tablet (1 Elevated psa  (primary encounter diagnosis)  Benign prostatic hyperplasia with urinary frequency  Nocturia     (R97.20) Elevated PSA  (primary encounter diagnosis)  Plan: On 6/7/18 PSA was elevated at 4.3, Free PSA: 18% (probability of prostate cancer 20 observation.    (R35.1) Nocturia  Plan: Patient's AUA score today was 14, moderate voiding dysfunction category worse than AUA 7, mild on chart review (6/12/17). The patient is  taking tamsulosin; pt states he does not take it consistently as he forgets.  D hours before bedtime, hence, try to stop drinking tea or coffee at around noontime. 2. If you snore, please see your primary care physician to discuss whether workup indicated as snoring can contribute to waking up at night to urinate.      3. If your l

## 2018-06-25 ENCOUNTER — TELEPHONE (OUTPATIENT)
Dept: INTERNAL MEDICINE CLINIC | Facility: CLINIC | Age: 73
End: 2018-06-25

## 2018-06-25 NOTE — TELEPHONE ENCOUNTER
Marcie/Dr. Feroz Purcell with [facility] called stating that Pt was seen and he informed the office that all the records from his hospital visit in [place] were given to Dr. Jinnie Frankel during a follow up visit.  He stated that there was a CT CD-Rom given and [facility] stat

## 2018-07-05 ENCOUNTER — LAB ENCOUNTER (OUTPATIENT)
Dept: LAB | Facility: HOSPITAL | Age: 73
End: 2018-07-05
Attending: INTERNAL MEDICINE
Payer: MEDICARE

## 2018-07-05 ENCOUNTER — OFFICE VISIT (OUTPATIENT)
Dept: HEMATOLOGY/ONCOLOGY | Facility: HOSPITAL | Age: 73
End: 2018-07-05
Attending: INTERNAL MEDICINE
Payer: MEDICARE

## 2018-07-05 VITALS
BODY MASS INDEX: 25.76 KG/M2 | DIASTOLIC BLOOD PRESSURE: 60 MMHG | TEMPERATURE: 98 F | WEIGHT: 184 LBS | RESPIRATION RATE: 18 BRPM | SYSTOLIC BLOOD PRESSURE: 115 MMHG | HEART RATE: 66 BPM | HEIGHT: 71 IN

## 2018-07-05 DIAGNOSIS — Z85.6 HISTORY OF ACUTE LYMPHOBLASTIC LEUKEMIA (ALL): ICD-10-CM

## 2018-07-05 DIAGNOSIS — R06.00 DYSPNEA ON EXERTION: ICD-10-CM

## 2018-07-05 DIAGNOSIS — R14.0 ABDOMINAL DISTENTION: ICD-10-CM

## 2018-07-05 DIAGNOSIS — Z85.6 HISTORY OF ACUTE LYMPHOBLASTIC LEUKEMIA (ALL): Primary | ICD-10-CM

## 2018-07-05 DIAGNOSIS — Z78.9 HEAVY ALCOHOL USE: ICD-10-CM

## 2018-07-05 DIAGNOSIS — R53.83 OTHER FATIGUE: ICD-10-CM

## 2018-07-05 LAB
ALBUMIN SERPL BCP-MCNC: 4.3 G/DL (ref 3.5–4.8)
ALBUMIN/GLOB SERPL: 1.2 {RATIO} (ref 1–2)
ALP SERPL-CCNC: 179 U/L (ref 32–100)
ALT SERPL-CCNC: 21 U/L (ref 17–63)
ANION GAP SERPL CALC-SCNC: 8 MMOL/L (ref 0–18)
AST SERPL-CCNC: 28 U/L (ref 15–41)
BASOPHILS # BLD: 0 K/UL (ref 0–0.2)
BASOPHILS NFR BLD: 1 %
BILIRUB SERPL-MCNC: 1.6 MG/DL (ref 0.3–1.2)
BUN SERPL-MCNC: 18 MG/DL (ref 8–20)
BUN/CREAT SERPL: 17.5 (ref 10–20)
CALCIUM SERPL-MCNC: 9.4 MG/DL (ref 8.5–10.5)
CHLORIDE SERPL-SCNC: 104 MMOL/L (ref 95–110)
CO2 SERPL-SCNC: 22 MMOL/L (ref 22–32)
CREAT SERPL-MCNC: 1.03 MG/DL (ref 0.5–1.5)
EOSINOPHIL # BLD: 0.1 K/UL (ref 0–0.7)
EOSINOPHIL NFR BLD: 2 %
ERYTHROCYTE [DISTWIDTH] IN BLOOD BY AUTOMATED COUNT: 15.3 % (ref 11–15)
GLOBULIN PLAS-MCNC: 3.6 G/DL (ref 2.5–3.7)
GLUCOSE SERPL-MCNC: 104 MG/DL (ref 70–99)
HCT VFR BLD AUTO: 31.7 % (ref 41–52)
HGB BLD-MCNC: 10.7 G/DL (ref 13.5–17.5)
LYMPHOCYTES # BLD: 0.8 K/UL (ref 1–4)
LYMPHOCYTES NFR BLD: 19 %
MCH RBC QN AUTO: 31.6 PG (ref 27–32)
MCHC RBC AUTO-ENTMCNC: 33.6 G/DL (ref 32–37)
MCV RBC AUTO: 93.8 FL (ref 80–100)
MONOCYTES # BLD: 0.4 K/UL (ref 0–1)
MONOCYTES NFR BLD: 10 %
NEUTROPHILS # BLD AUTO: 3 K/UL (ref 1.8–7.7)
NEUTROPHILS NFR BLD: 69 %
OSMOLALITY UR CALC.SUM OF ELEC: 280 MOSM/KG (ref 275–295)
PATIENT FASTING: NO
PLATELET # BLD AUTO: 116 K/UL (ref 140–400)
PMV BLD AUTO: 8.5 FL (ref 7.4–10.3)
POTASSIUM SERPL-SCNC: 3.9 MMOL/L (ref 3.3–5.1)
PROT SERPL-MCNC: 7.9 G/DL (ref 5.9–8.4)
RBC # BLD AUTO: 3.38 M/UL (ref 4.5–5.9)
SODIUM SERPL-SCNC: 134 MMOL/L (ref 136–144)
VIT B12 SERPL-MCNC: 446 PG/ML (ref 181–914)
WBC # BLD AUTO: 4.4 K/UL (ref 4–11)

## 2018-07-05 PROCEDURE — 99205 OFFICE O/P NEW HI 60 MIN: CPT | Performed by: INTERNAL MEDICINE

## 2018-07-05 PROCEDURE — 85025 COMPLETE CBC W/AUTO DIFF WBC: CPT

## 2018-07-05 PROCEDURE — 36415 COLL VENOUS BLD VENIPUNCTURE: CPT

## 2018-07-05 PROCEDURE — 82607 VITAMIN B-12: CPT

## 2018-07-05 PROCEDURE — 80053 COMPREHEN METABOLIC PANEL: CPT

## 2018-07-05 NOTE — PROGRESS NOTES
[facility] Consultation Note    Patient Name: Kaley Pineda   YOB: 1945   Medical Record Number: [id number]   CSN: [id number]   Consulting Physician: Samir Mcgarry MD  Referring Physician(s): Kajal Bright  Date of Consultation: 7 in the setting of increased liver enzymes concerning for alcohol-related disease. Roscoe assist dyspnea on exertion and fatigue as his main symptoms. He denies recurrent fevers or infections, night sweats, lymphadenopathy, reports weight is now stabilized. History   Marital status:   Spouse name: N/A    Years of education: N/A  Number of children: 2     Occupational History    retired     Social History Main Topics   Smoking status: Never Smoker    Smokeless tobacco: Never Used Rfl:    Vitamin B-1 (VITAMIN B1) 100 MG Oral Tab Take 100 mg by mouth daily. Disp:  Rfl:        Review of Systems:    Constitutional: Negative for anorexia, chills,fevers, night sweats and +fatigue, +weight loss.   Eyes: Negative for visual disturbance, irr unable to carry out any work activities.  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

## 2018-07-11 ENCOUNTER — HOSPITAL ENCOUNTER (OUTPATIENT)
Dept: CT IMAGING | Facility: HOSPITAL | Age: 73
Discharge: HOME | End: 2018-07-11
Attending: INTERNAL MEDICINE
Payer: MEDICARE

## 2018-07-11 DIAGNOSIS — Z85.6 HISTORY OF ACUTE LYMPHOBLASTIC LEUKEMIA (ALL): ICD-10-CM

## 2018-07-11 DIAGNOSIS — Z78.9 HEAVY ALCOHOL USE: ICD-10-CM

## 2018-07-11 DIAGNOSIS — R06.00 DYSPNEA ON EXERTION: ICD-10-CM

## 2018-07-11 DIAGNOSIS — R53.83 OTHER FATIGUE: ICD-10-CM

## 2018-07-11 DIAGNOSIS — R14.0 ABDOMINAL DISTENTION: ICD-10-CM

## 2018-07-11 PROCEDURE — 74177 CT ABD & PELVIS W/CONTRAST: CPT | Performed by: INTERNAL MEDICINE

## 2018-07-11 PROCEDURE — 71260 CT THORAX DX C+: CPT | Performed by: INTERNAL MEDICINE

## 2018-07-13 ENCOUNTER — OFFICE VISIT (OUTPATIENT)
Dept: HEMATOLOGY/ONCOLOGY | Facility: HOSPITAL | Age: 73
End: 2018-07-13
Attending: INTERNAL MEDICINE
Payer: MEDICARE

## 2018-07-13 ENCOUNTER — APPOINTMENT (OUTPATIENT)
Dept: LAB | Facility: HOSPITAL | Age: 73
End: 2018-07-13
Attending: INTERNAL MEDICINE
Payer: MEDICARE

## 2018-07-13 VITALS
DIASTOLIC BLOOD PRESSURE: 60 MMHG | HEART RATE: 58 BPM | RESPIRATION RATE: 18 BRPM | HEIGHT: 71 IN | TEMPERATURE: 98 F | BODY MASS INDEX: 25.62 KG/M2 | SYSTOLIC BLOOD PRESSURE: 111 MMHG | WEIGHT: 183 LBS

## 2018-07-13 DIAGNOSIS — Z78.9 HEAVY ALCOHOL USE: ICD-10-CM

## 2018-07-13 DIAGNOSIS — R14.0 ABDOMINAL DISTENTION: ICD-10-CM

## 2018-07-13 DIAGNOSIS — R06.00 DYSPNEA ON EXERTION: ICD-10-CM

## 2018-07-13 DIAGNOSIS — R53.83 OTHER FATIGUE: ICD-10-CM

## 2018-07-13 DIAGNOSIS — Z85.6 HISTORY OF ACUTE LYMPHOBLASTIC LEUKEMIA (ALL): Primary | ICD-10-CM

## 2018-07-13 DIAGNOSIS — K70.31 ALCOHOLIC CIRRHOSIS OF LIVER WITH ASCITES (HCC): ICD-10-CM

## 2018-07-13 DIAGNOSIS — Z85.6 HISTORY OF ACUTE LYMPHOBLASTIC LEUKEMIA (ALL): ICD-10-CM

## 2018-07-13 LAB
AFP-TM SERPL-MCNC: 1.8 NG/ML (ref 0–8.9)
APTT PPP: 37.9 SECONDS (ref 23.2–35.3)
INR BLD: 1.2 (ref 0.9–1.2)
LDH SERPL L TO P-CCNC: 187 U/L (ref 98–192)
PROTHROMBIN TIME: 14.8 SECONDS (ref 11.8–14.5)
URATE SERPL-MCNC: 7.8 MG/DL (ref 3.3–8.7)

## 2018-07-13 PROCEDURE — 85730 THROMBOPLASTIN TIME PARTIAL: CPT

## 2018-07-13 PROCEDURE — 99215 OFFICE O/P EST HI 40 MIN: CPT | Performed by: INTERNAL MEDICINE

## 2018-07-13 PROCEDURE — 84550 ASSAY OF BLOOD/URIC ACID: CPT

## 2018-07-13 PROCEDURE — 36415 COLL VENOUS BLD VENIPUNCTURE: CPT

## 2018-07-13 PROCEDURE — 83615 LACTATE (LD) (LDH) ENZYME: CPT

## 2018-07-13 PROCEDURE — 82105 ALPHA-FETOPROTEIN SERUM: CPT

## 2018-07-13 PROCEDURE — 85610 PROTHROMBIN TIME: CPT

## 2018-07-13 NOTE — PROGRESS NOTES
[facility] Progress Note    Patient Name: Holly Art   YOB: 1945   Medical Record Number: [id number]   CSN: [id number]   Consulting Physician: Franco Gu MD  Referring Physician(s): Ariela Grant  Date of Visit: 7/13/2018 increased liver enzymes concerning for alcohol-related disease. Roscoe assist dyspnea on exertion and fatigue as his main symptoms. He denies recurrent fevers or infections, night sweats, lymphadenopathy, reports weight is now stabilized.   Sergey Kennedy is having so SURGERY      Comment: ventral hernia repair  2018: KNEE REPLACEMENT SURGERY Right  No date: SKIN SURGERY      Comment: basal cell carcinoma removal from nose and ear    Family Medical History:  Family History   Problem Relation Age of Onset   • Cancer Fath total) by mouth once daily. Disp: 90 tablet Rfl: 3   folic acid 1 MG Oral Tab Take 1 tablet (1 mg total) by mouth daily. Disp: 90 tablet Rfl: 1   Metoprolol Succinate ER 50 MG Oral Tablet 24 Hr Take 1 tablet (50 mg total) by mouth once daily.  Disp: 90 tabl Thin framed male  Psych: Memory impairment present, but with appropriate questions and responses  HEENT: EOMs intact. Oropharynx is clear. Neck: No palpable lymphadenopathy. Neck is supple. Lymphatics:  There is no palpable lymphadenopathy throughout in study from   4/2018. AORTA:             Atherosclerotic calcifications. No aneurysm. CHEST WALL:  Gynecomastia. No significant axillary adenopathy.   LUNGS:             Right paramediastinal scarring extending into the right upper lobe, similar to previou fracture or suspicious osseous lesion. OTHER:             Negative.     =====  CONCLUSION:      1. Worsening left greater than right pleural effusions. 2. Presumed right paramediastinal and upper lobe post radiation changes.       3. 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 More than 50% of that time was spent counseling the patient and/or on coordination of care. The diagnosis, prognosis, and general treatment was explained to the patient and the family.     Xiao Goldstein MD

## 2018-07-17 VITALS — HEIGHT: 71 IN | BODY MASS INDEX: 25.2 KG/M2 | WEIGHT: 180 LBS

## 2018-07-18 ENCOUNTER — HOSPITAL ENCOUNTER (OUTPATIENT)
Dept: INTERVENTIONAL RADIOLOGY/VASCULAR | Facility: HOSPITAL | Age: 73
Discharge: HOME | End: 2018-07-18
Attending: INTERNAL MEDICINE
Payer: MEDICARE

## 2018-07-18 ENCOUNTER — HOSPITAL ENCOUNTER (OUTPATIENT)
Dept: CT IMAGING | Facility: HOSPITAL | Age: 73
Discharge: HOME | End: 2018-07-18
Attending: INTERNAL MEDICINE
Payer: MEDICARE

## 2018-07-19 RX ORDER — SODIUM CHLORIDE 9 MG/ML
INJECTION, SOLUTION INTRAVENOUS CONTINUOUS
Status: CANCELLED | OUTPATIENT
Start: 2018-07-19

## 2018-07-19 RX ORDER — SODIUM CHLORIDE 9 MG/ML
INJECTION, SOLUTION INTRAVENOUS CONTINUOUS
Status: DISCONTINUED | OUTPATIENT
Start: 2018-07-19 | End: 2018-07-22

## 2018-07-20 ENCOUNTER — HOSPITAL ENCOUNTER (OUTPATIENT)
Dept: CT IMAGING | Facility: HOSPITAL | Age: 73
Discharge: HOME | End: 2018-07-20
Attending: INTERNAL MEDICINE
Payer: MEDICARE

## 2018-07-20 VITALS
DIASTOLIC BLOOD PRESSURE: 75 MMHG | SYSTOLIC BLOOD PRESSURE: 132 MMHG | RESPIRATION RATE: 17 BRPM | HEART RATE: 59 BPM | OXYGEN SATURATION: 95 %

## 2018-07-20 DIAGNOSIS — Z85.6 HISTORY OF ACUTE LYMPHOBLASTIC LEUKEMIA (ALL): ICD-10-CM

## 2018-07-20 DIAGNOSIS — K70.31 ALCOHOLIC CIRRHOSIS OF LIVER WITH ASCITES (HCC): ICD-10-CM

## 2018-07-20 DIAGNOSIS — R53.83 OTHER FATIGUE: ICD-10-CM

## 2018-07-20 PROCEDURE — 32555 ASPIRATE PLEURA W/ IMAGING: CPT

## 2018-07-20 PROCEDURE — 49180 BIOPSY ABDOMINAL MASS: CPT | Performed by: INTERNAL MEDICINE

## 2018-07-20 PROCEDURE — 99153 MOD SED SAME PHYS/QHP EA: CPT | Performed by: INTERNAL MEDICINE

## 2018-07-20 PROCEDURE — 88187 FLOWCYTOMETRY/READ 2-8: CPT | Performed by: RADIOLOGY

## 2018-07-20 PROCEDURE — 77012 CT SCAN FOR NEEDLE BIOPSY: CPT | Performed by: INTERNAL MEDICINE

## 2018-07-20 PROCEDURE — 88342 IMHCHEM/IMCYTCHM 1ST ANTB: CPT | Performed by: INTERNAL MEDICINE

## 2018-07-20 PROCEDURE — 88112 CYTOPATH CELL ENHANCE TECH: CPT | Performed by: INTERNAL MEDICINE

## 2018-07-20 PROCEDURE — 99152 MOD SED SAME PHYS/QHP 5/>YRS: CPT | Performed by: INTERNAL MEDICINE

## 2018-07-20 PROCEDURE — 88334 PATH CONSLTJ SURG CYTO XM EA: CPT | Performed by: INTERNAL MEDICINE

## 2018-07-20 PROCEDURE — 88185 FLOWCYTOMETRY/TC ADD-ON: CPT | Performed by: RADIOLOGY

## 2018-07-20 PROCEDURE — 88184 FLOWCYTOMETRY/ TC 1 MARKER: CPT | Performed by: RADIOLOGY

## 2018-07-20 PROCEDURE — 88333 PATH CONSLTJ SURG CYTO XM 1: CPT | Performed by: INTERNAL MEDICINE

## 2018-07-20 PROCEDURE — 88341 IMHCHEM/IMCYTCHM EA ADD ANTB: CPT | Performed by: INTERNAL MEDICINE

## 2018-07-20 PROCEDURE — 88305 TISSUE EXAM BY PATHOLOGIST: CPT | Performed by: INTERNAL MEDICINE

## 2018-07-20 RX ORDER — LIDOCAINE HYDROCHLORIDE 20 MG/ML
INJECTION, SOLUTION EPIDURAL; INFILTRATION; INTRACAUDAL; PERINEURAL
Status: COMPLETED
Start: 2018-07-20 | End: 2018-07-20

## 2018-07-20 RX ORDER — LIDOCAINE HYDROCHLORIDE 10 MG/ML
10 INJECTION, SOLUTION EPIDURAL; INFILTRATION; INTRACAUDAL; PERINEURAL ONCE
Status: COMPLETED | OUTPATIENT
Start: 2018-07-20 | End: 2018-07-20

## 2018-07-20 RX ORDER — SODIUM CHLORIDE 9 MG/ML
INJECTION, SOLUTION INTRAVENOUS
Status: DISPENSED
Start: 2018-07-20 | End: 2018-07-20

## 2018-07-20 RX ORDER — MIDAZOLAM HYDROCHLORIDE 1 MG/ML
INJECTION INTRAMUSCULAR; INTRAVENOUS
Status: DISPENSED
Start: 2018-07-20 | End: 2018-07-20

## 2018-07-20 RX ORDER — MIDAZOLAM HYDROCHLORIDE 1 MG/ML
4 INJECTION INTRAMUSCULAR; INTRAVENOUS ONCE
Status: COMPLETED | OUTPATIENT
Start: 2018-07-20 | End: 2018-07-20

## 2018-07-20 RX ADMIN — MIDAZOLAM HYDROCHLORIDE 1 MG: 1 INJECTION INTRAMUSCULAR; INTRAVENOUS at 11:26:00

## 2018-07-20 RX ADMIN — LIDOCAINE HYDROCHLORIDE 7 ML: 10 INJECTION, SOLUTION EPIDURAL; INFILTRATION; INTRACAUDAL; PERINEURAL at 11:00:00

## 2018-07-20 NOTE — PROGRESS NOTES
Pt is able to sit up and ambulate without difficulty. Pt voided and tolerated fluids. Procedural site remains dry and intact. No signs and symptoms of bleeding/hematoma noted. Instruction provided, patient/family verbalizes understanding.    Follow

## 2018-07-20 NOTE — PROGRESS NOTES
Patient in IR for Ultrasound guided left sided thoracentesis completed in holding rm 9. Timeout/universal protocol completed. 10 ml of 2% lidocaine given by MD to Left back. Patient monitored, VSS during procedure. Patient tolerated procedure well.   [address]

## 2018-07-23 ENCOUNTER — OFFICE VISIT (OUTPATIENT)
Dept: HEMATOLOGY/ONCOLOGY | Facility: HOSPITAL | Age: 73
End: 2018-07-23
Attending: INTERNAL MEDICINE
Payer: MEDICARE

## 2018-07-23 VITALS
WEIGHT: 174 LBS | TEMPERATURE: 98 F | RESPIRATION RATE: 18 BRPM | HEIGHT: 71 IN | BODY MASS INDEX: 24.36 KG/M2 | HEART RATE: 67 BPM | DIASTOLIC BLOOD PRESSURE: 61 MMHG | SYSTOLIC BLOOD PRESSURE: 104 MMHG

## 2018-07-23 DIAGNOSIS — Z85.6 HISTORY OF ACUTE LYMPHOBLASTIC LEUKEMIA (ALL): Primary | ICD-10-CM

## 2018-07-23 DIAGNOSIS — K70.31 ALCOHOLIC CIRRHOSIS OF LIVER WITH ASCITES (HCC): ICD-10-CM

## 2018-07-23 DIAGNOSIS — Z78.9 HEAVY ALCOHOL USE: ICD-10-CM

## 2018-07-23 LAB
CD19 CELLS NFR SPEC: 36 %
CD20 CELLS NFR SPEC: 37 %
CD45 CELLS NFR SPEC: 100 %
CELL SURF KAPPA/LAMBDA RATIO: 1.7
CELL SURF LAMBDA LIGHT CHAIN: 15 %
CELL SURFACE KAPPA LIGHT CHAIN: 25 %

## 2018-07-23 PROCEDURE — 99214 OFFICE O/P EST MOD 30 MIN: CPT | Performed by: INTERNAL MEDICINE

## 2018-08-01 ENCOUNTER — TELEPHONE (OUTPATIENT)
Dept: OTHER | Age: 73
End: 2018-08-01

## 2018-08-01 DIAGNOSIS — H91.93 HEARING DECREASED, BILATERAL: Primary | ICD-10-CM

## 2018-08-01 NOTE — TELEPHONE ENCOUNTER
Patient requesting referral for his audiologist, Dr Heather Monroy \"difficulty hearing in both ears-I need an assessment. \"    Referral pended    Please advise     Please respond to pool: [facility] LPN/CMA

## 2018-08-13 ENCOUNTER — LAB ENCOUNTER (OUTPATIENT)
Dept: LAB | Facility: HOSPITAL | Age: 73
End: 2018-08-13
Attending: INTERNAL MEDICINE
Payer: MEDICARE

## 2018-08-13 DIAGNOSIS — K70.31 ALCOHOLIC CIRRHOSIS OF LIVER WITH ASCITES (HCC): ICD-10-CM

## 2018-08-13 LAB
ALBUMIN SERPL BCP-MCNC: 4.4 G/DL (ref 3.5–4.8)
ALBUMIN/GLOB SERPL: 1.1 {RATIO} (ref 1–2)
ALP SERPL-CCNC: 194 U/L (ref 32–100)
ALT SERPL-CCNC: 27 U/L (ref 17–63)
ANION GAP SERPL CALC-SCNC: 8 MMOL/L (ref 0–18)
AST SERPL-CCNC: 34 U/L (ref 15–41)
BASOPHILS # BLD: 0 K/UL (ref 0–0.2)
BASOPHILS NFR BLD: 0 %
BILIRUB SERPL-MCNC: 1.4 MG/DL (ref 0.3–1.2)
BUN SERPL-MCNC: 24 MG/DL (ref 8–20)
BUN/CREAT SERPL: 24 (ref 10–20)
CALCIUM SERPL-MCNC: 9.6 MG/DL (ref 8.5–10.5)
CHLORIDE SERPL-SCNC: 100 MMOL/L (ref 95–110)
CO2 SERPL-SCNC: 25 MMOL/L (ref 22–32)
CREAT SERPL-MCNC: 1 MG/DL (ref 0.5–1.5)
EOSINOPHIL # BLD: 0.1 K/UL (ref 0–0.7)
EOSINOPHIL NFR BLD: 3 %
ERYTHROCYTE [DISTWIDTH] IN BLOOD BY AUTOMATED COUNT: 14.8 % (ref 11–15)
GLOBULIN PLAS-MCNC: 3.9 G/DL (ref 2.5–3.7)
GLUCOSE SERPL-MCNC: 102 MG/DL (ref 70–99)
HCT VFR BLD AUTO: 33.8 % (ref 41–52)
HGB BLD-MCNC: 11.3 G/DL (ref 13.5–17.5)
INR BLD: 1.2 (ref 0.9–1.2)
LYMPHOCYTES # BLD: 0.8 K/UL (ref 1–4)
LYMPHOCYTES NFR BLD: 18 %
MCH RBC QN AUTO: 31.6 PG (ref 27–32)
MCHC RBC AUTO-ENTMCNC: 33.4 G/DL (ref 32–37)
MCV RBC AUTO: 94.7 FL (ref 80–100)
MONOCYTES # BLD: 0.4 K/UL (ref 0–1)
MONOCYTES NFR BLD: 9 %
NEUTROPHILS # BLD AUTO: 3 K/UL (ref 1.8–7.7)
NEUTROPHILS NFR BLD: 70 %
OSMOLALITY UR CALC.SUM OF ELEC: 280 MOSM/KG (ref 275–295)
PATIENT FASTING: NO
PLATELET # BLD AUTO: 111 K/UL (ref 140–400)
PMV BLD AUTO: 8.9 FL (ref 7.4–10.3)
POTASSIUM SERPL-SCNC: 4.1 MMOL/L (ref 3.3–5.1)
PROT SERPL-MCNC: 8.3 G/DL (ref 5.9–8.4)
PROTHROMBIN TIME: 14.8 SECONDS (ref 11.8–14.5)
RBC # BLD AUTO: 3.57 M/UL (ref 4.5–5.9)
SODIUM SERPL-SCNC: 133 MMOL/L (ref 136–144)
WBC # BLD AUTO: 4.3 K/UL (ref 4–11)

## 2018-08-13 PROCEDURE — 87340 HEPATITIS B SURFACE AG IA: CPT

## 2018-08-13 PROCEDURE — 86709 HEPATITIS A IGM ANTIBODY: CPT

## 2018-08-13 PROCEDURE — 36415 COLL VENOUS BLD VENIPUNCTURE: CPT

## 2018-08-13 PROCEDURE — 86235 NUCLEAR ANTIGEN ANTIBODY: CPT

## 2018-08-13 PROCEDURE — 86803 HEPATITIS C AB TEST: CPT

## 2018-08-13 PROCEDURE — 82103 ALPHA-1-ANTITRYPSIN TOTAL: CPT

## 2018-08-13 PROCEDURE — 86704 HEP B CORE ANTIBODY TOTAL: CPT

## 2018-08-13 PROCEDURE — 82104 ALPHA-1-ANTITRYPSIN PHENO: CPT

## 2018-08-13 PROCEDURE — 80053 COMPREHEN METABOLIC PANEL: CPT

## 2018-08-13 PROCEDURE — 86706 HEP B SURFACE ANTIBODY: CPT

## 2018-08-13 PROCEDURE — 86708 HEPATITIS A ANTIBODY: CPT

## 2018-08-13 PROCEDURE — 85025 COMPLETE CBC W/AUTO DIFF WBC: CPT

## 2018-08-13 PROCEDURE — 85610 PROTHROMBIN TIME: CPT

## 2018-08-13 PROCEDURE — 86225 DNA ANTIBODY NATIVE: CPT

## 2018-08-13 PROCEDURE — 86255 FLUORESCENT ANTIBODY SCREEN: CPT

## 2018-08-13 PROCEDURE — 86038 ANTINUCLEAR ANTIBODIES: CPT

## 2018-08-13 PROCEDURE — 86039 ANTINUCLEAR ANTIBODIES (ANA): CPT

## 2018-08-13 NOTE — PROGRESS NOTES
[facility] at [facility]  [address] [facility] [facility] [address]  [address]  [phone] ([phone]) reported after 5 yrs of surveillance   • Myocardial infarct [facility]) 1990   • Obesity, unspecified    • Other and unspecified hyperlipidemia     drug therapy   • Perforated ear drum    • Tubular adenoma of colon 5/2016    Repeat in 5/2019   • Unspecified esse tablet, Rfl: 3  •  Potassium Chloride ER 10 MEQ Oral Tab CR, Take 2 tablets (20 mEq total) by mouth daily. , Disp: 180 tablet, Rfl: 1  •  atorvastatin 80 MG Oral Tab, Take 1 tablet (80 mg total) by mouth once daily. , Disp: 90 tablet, Rfl: 3  •  aspirin 325

## 2018-08-14 LAB
HAV AB SER QL IA: REACTIVE
HAV IGM SERPL QL IA: NONREACTIVE
HBV CORE AB SERPL QL IA: NONREACTIVE
HBV SURFACE AB SER-ACNC: <3.1 MIU/ML (ref ?–10)
HBV SURFACE AG SERPL QL IA: NONREACTIVE
HBV SURFACE AG SERPL QL IA: NONREACTIVE
HCV AB SERPL QL IA: NONREACTIVE
MITOCHONDRIA AB TITR SER: <20 {TITER}

## 2018-08-16 LAB
ALPHA-1-ANTITRYPSIN: 165 MG/DL
NUCLEAR IGG TITR SER IF: POSITIVE {TITER}

## 2018-08-17 LAB
ANA NUCLEOLAR TITR SER IF: 80 {TITER}
DSDNA AB TITR SER: <10 {TITER}

## 2018-08-18 ENCOUNTER — TELEPHONE (OUTPATIENT)
Dept: GASTROENTEROLOGY | Facility: CLINIC | Age: 73
End: 2018-08-18

## 2018-08-18 DIAGNOSIS — K74.60 HEPATIC CIRRHOSIS, UNSPECIFIED HEPATIC CIRRHOSIS TYPE, UNSPECIFIED WHETHER ASCITES PRESENT (HCC): Primary | ICD-10-CM

## 2018-08-18 DIAGNOSIS — Z87.19 HISTORY OF ESOPHAGEAL VARICES: ICD-10-CM

## 2018-08-18 NOTE — TELEPHONE ENCOUNTER
GI Clinical Staff:   Please call patient to schedule EGD with MAC (dx: cirrhosis, varices screening). No meds to hold. Continue aspirin.

## 2018-08-20 ENCOUNTER — OFFICE VISIT (OUTPATIENT)
Dept: INTERNAL MEDICINE CLINIC | Facility: CLINIC | Age: 73
End: 2018-08-20
Payer: MEDICARE

## 2018-08-20 VITALS
WEIGHT: 171 LBS | HEART RATE: 62 BPM | RESPIRATION RATE: 16 BRPM | DIASTOLIC BLOOD PRESSURE: 60 MMHG | BODY MASS INDEX: 23.94 KG/M2 | HEIGHT: 71 IN | SYSTOLIC BLOOD PRESSURE: 113 MMHG

## 2018-08-20 DIAGNOSIS — E78.2 MIXED HYPERLIPIDEMIA: ICD-10-CM

## 2018-08-20 DIAGNOSIS — M54.50 ACUTE MIDLINE LOW BACK PAIN WITHOUT SCIATICA: ICD-10-CM

## 2018-08-20 DIAGNOSIS — R53.1 WEAKNESS: ICD-10-CM

## 2018-08-20 DIAGNOSIS — F32.1 CURRENT MODERATE EPISODE OF MAJOR DEPRESSIVE DISORDER WITHOUT PRIOR EPISODE (HCC): ICD-10-CM

## 2018-08-20 DIAGNOSIS — F10.10 ALCOHOL ABUSE: ICD-10-CM

## 2018-08-20 DIAGNOSIS — R59.1 LYMPHADENOPATHY: Primary | ICD-10-CM

## 2018-08-20 PROCEDURE — G0463 HOSPITAL OUTPT CLINIC VISIT: HCPCS | Performed by: INTERNAL MEDICINE

## 2018-08-20 PROCEDURE — 99215 OFFICE O/P EST HI 40 MIN: CPT | Performed by: INTERNAL MEDICINE

## 2018-08-20 RX ORDER — ATORVASTATIN CALCIUM 80 MG/1
80 TABLET, FILM COATED ORAL
Qty: 90 TABLET | Refills: 3 | Status: SHIPPED | OUTPATIENT
Start: 2018-08-20 | End: 2019-05-20

## 2018-08-20 RX ORDER — PANTOPRAZOLE SODIUM 40 MG/1
40 TABLET, DELAYED RELEASE ORAL
Qty: 90 TABLET | Refills: 1 | Status: SHIPPED | OUTPATIENT
Start: 2018-08-20 | End: 2019-05-20

## 2018-08-20 RX ORDER — LISINOPRIL 10 MG/1
10 TABLET ORAL
Qty: 90 TABLET | Refills: 3 | Status: SHIPPED | OUTPATIENT
Start: 2018-08-20 | End: 2018-09-14

## 2018-08-20 NOTE — PROGRESS NOTES
Subha St is a 66year old male. HPI:   1. Lymphadenopathy    Was found to have enlarged nodes on abdominal CT scan done in [place]. Was seen by another MD and needs reperat liver evaluation in 6 months by Dr Conor Ibarra.  Needs to follow up wit Tablet 24 Hr TAKE 1 TABLET BY MOUTH EVERY DAY Disp: 90 tablet Rfl: 1   LISINOPRIL 10 MG Oral Tab TAKE 1 TABLET(10 MG) BY MOUTH EVERY DAY Disp: 90 tablet Rfl: 1   Atorvastatin Calcium 80 MG Oral Tab Take 1 tablet (80 mg total) by mouth daily.  Disp: 90 table chest pain or palpitations  GI: denies abdominal pain, dyspepsia or change in appetite  : Denies dysuria/hematuria or change in  Urination  MS: Denies muscle aches, cramps or weakness  PSYCH: Denies depression, anxiety, suicidal thoughts  NEURO: denies h the abdomen lymph nodes done 6 weeks ago. 5. Weakness    Has been more weak lately. No enegy. Can't walk a long ways. Has decreased stamina. Will send for some therapy to see if strength improves. - PHYSICAL THERAPY - INTERNAL    6.  Current moderat

## 2018-08-21 LAB
ENA SM IGG SER QL: NEGATIVE
ENA SM+RNP AB SER QL: NEGATIVE
ENA SS-A AB SER QL IA: NEGATIVE
ENA SS-B AB SER QL IA: NEGATIVE

## 2018-08-21 NOTE — TELEPHONE ENCOUNTER
Scheduled for:  EGD 64795   Provider Name: Dr. Marija Jamison  Date:  10/17/18  Location:  [facility]  Sedation:  MAC  Time:  4359 (pt is aware to arrive at [address] [place] [address])   Prep:  NPO after midnight, mailed 8/22/18  Meds/Allergies Reconciled?:  Physician reviewed   Diagnosis with

## 2018-09-14 ENCOUNTER — APPOINTMENT (OUTPATIENT)
Dept: CT IMAGING | Facility: HOSPITAL | Age: 73
End: 2018-09-14
Attending: EMERGENCY MEDICINE
Payer: MEDICARE

## 2018-09-14 ENCOUNTER — OFFICE VISIT (OUTPATIENT)
Dept: INTERNAL MEDICINE CLINIC | Facility: CLINIC | Age: 73
End: 2018-09-14
Payer: MEDICARE

## 2018-09-14 VITALS
HEART RATE: 59 BPM | HEIGHT: 71 IN | BODY MASS INDEX: 25.48 KG/M2 | WEIGHT: 182 LBS | SYSTOLIC BLOOD PRESSURE: 98 MMHG | DIASTOLIC BLOOD PRESSURE: 60 MMHG

## 2018-09-14 DIAGNOSIS — R53.83 OTHER FATIGUE: Primary | ICD-10-CM

## 2018-09-14 PROCEDURE — 99213 OFFICE O/P EST LOW 20 MIN: CPT | Performed by: PHYSICIAN ASSISTANT

## 2018-09-14 PROCEDURE — G0463 HOSPITAL OUTPT CLINIC VISIT: HCPCS | Performed by: PHYSICIAN ASSISTANT

## 2018-09-14 PROCEDURE — 71260 CT THORAX DX C+: CPT | Performed by: EMERGENCY MEDICINE

## 2018-09-14 NOTE — ED PROVIDER NOTES
Patient Seen in: [facility] AND [facility] Emergency Department     History   Patient presents with:  Abnormal Result (metabolic, cardiac)    Stated Complaint: sent by doc - CANDE swelling, ascites, decreased liver func.     HPI    75year old male per outpatient note becoming short of breath as well. No fevers or infectious symptoms per denies any pain anywhere.     Past Medical History:   Diagnosis Date   • Basal cell carcinoma 2010    ear surgery   • Elevated PSA 2008    prostate biopsies, bx neg for cancer   • Epist Problem Relation Age of Onset   • Cancer Father [de-identified]        prostate (cause of death)   • Diabetes Father    • Heart Disorder Mother         cva   • Stroke Mother 67        cause of death   • Cancer Mother 76        cancer involving the bone   • Heart Diso Normal range of motion and phonation normal. Neck supple. No JVD present. Cardiovascular: Normal rate and normal heart sounds. Pulmonary/Chest: Effort normal and breath sounds normal. No accessory muscle usage or stridor. No apnea and no tachypnea.  No All other components within normal limits   AMMONIA, PLASMA - Normal   MAGNESIUM - Normal   CBC WITH DIFFERENTIAL WITH PLATELET    Narrative: The following orders were created for panel order CBC WITH DIFFERENTIAL WITH PLATELET.   Procedure left effusion is posterior to the     lung. Scattered linear pleural parenchymal bands of atelectasis and scar     in the lungs. There is a linear/port like pattern of fibrotic change in     the mediastinal margin of the right     pulmonary apex.   Melissa (CST): Danica Rocha MD on 9/14/2018 at 13:54                 ED Medications Administered:   Medications   iopamidol (ISOVUE-M) 76 % injection 100 mL (85 mL Intravenous Given 9/14/18 1338)         Procedures: None     09/14/18  1036 09/14/18  1207 09/14/18 Record Review:   I personally reviewed available prior medical records for any recent pertinent discharge summaries, testing, and procedures and reviewed those reports.      Radiology Interpretation:   Radiology reports and images reviewed personally and ar Steffen Forrest MD  [address]  [phone]    Schedule an appointment as soon as possible for a visit      Colonel Kaur, [address] [place] [address]  Nancy Daugherty [id number] [phone]    Schedule

## 2018-09-14 NOTE — ED NOTES
Pt presents to ED with a c/o abd distention and BLE swelling. States a hx of cirrhosis diagnosed earlier this year. Pt states having a paracentesis x1 in July. Also reports \"sleeping more often\".

## 2018-09-14 NOTE — PROGRESS NOTES
eHPI:    Patient ID: John Tran is a 75year old male.     HPI   Patient with history of hypertension, hyperlipidemia, congestive heart failure, major depression, BPH, ALL, CAD s/p CABG, alcohol abuse, and newly diagnosed liver cirrhosis presents with vomiting, abdominal pain and diarrhea. Genitourinary: Negative for dysuria, frequency and hematuria. Musculoskeletal: Negative for myalgias, joint pain and gait problem. Skin: Negative for rash. Neurological: Positive for weakness.  Negative for yara are normal. Pupils are equal, round, and reactive to light. Scleral icterus is present. Neck: Normal range of motion. Neck supple. Cardiovascular: Normal rate, regular rhythm and intact distal pulses. Murmur heard.    Crescendo decrescendo systolic m

## 2018-10-11 ENCOUNTER — OFFICE VISIT (OUTPATIENT)
Dept: INTERNAL MEDICINE CLINIC | Facility: CLINIC | Age: 73
End: 2018-10-11
Payer: MEDICARE

## 2018-10-11 ENCOUNTER — LAB ENCOUNTER (OUTPATIENT)
Dept: LAB | Facility: HOSPITAL | Age: 73
End: 2018-10-11
Attending: INTERNAL MEDICINE
Payer: MEDICARE

## 2018-10-11 VITALS
WEIGHT: 164 LBS | DIASTOLIC BLOOD PRESSURE: 73 MMHG | HEIGHT: 71 IN | SYSTOLIC BLOOD PRESSURE: 122 MMHG | HEART RATE: 62 BPM | RESPIRATION RATE: 16 BRPM | BODY MASS INDEX: 22.96 KG/M2

## 2018-10-11 DIAGNOSIS — K70.31 ALCOHOLIC CIRRHOSIS OF LIVER WITH ASCITES (HCC): ICD-10-CM

## 2018-10-11 DIAGNOSIS — F32.1 CURRENT MODERATE EPISODE OF MAJOR DEPRESSIVE DISORDER WITHOUT PRIOR EPISODE (HCC): ICD-10-CM

## 2018-10-11 DIAGNOSIS — E78.2 MIXED HYPERLIPIDEMIA: ICD-10-CM

## 2018-10-11 DIAGNOSIS — I10 ESSENTIAL HYPERTENSION WITH GOAL BLOOD PRESSURE LESS THAN 140/90: Primary | ICD-10-CM

## 2018-10-11 DIAGNOSIS — Z23 NEED FOR VACCINATION: ICD-10-CM

## 2018-10-11 DIAGNOSIS — F10.10 ALCOHOL ABUSE: ICD-10-CM

## 2018-10-11 DIAGNOSIS — Z78.9 HEAVY ALCOHOL USE: ICD-10-CM

## 2018-10-11 DIAGNOSIS — Z85.6 HISTORY OF ACUTE LYMPHOBLASTIC LEUKEMIA (ALL): ICD-10-CM

## 2018-10-11 PROCEDURE — 80053 COMPREHEN METABOLIC PANEL: CPT

## 2018-10-11 PROCEDURE — 88184 FLOWCYTOMETRY/ TC 1 MARKER: CPT

## 2018-10-11 PROCEDURE — 36415 COLL VENOUS BLD VENIPUNCTURE: CPT

## 2018-10-11 PROCEDURE — G0008 ADMIN INFLUENZA VIRUS VAC: HCPCS | Performed by: INTERNAL MEDICINE

## 2018-10-11 PROCEDURE — 85025 COMPLETE CBC W/AUTO DIFF WBC: CPT

## 2018-10-11 PROCEDURE — 83615 LACTATE (LD) (LDH) ENZYME: CPT

## 2018-10-11 PROCEDURE — 90653 IIV ADJUVANT VACCINE IM: CPT | Performed by: INTERNAL MEDICINE

## 2018-10-11 PROCEDURE — 84550 ASSAY OF BLOOD/URIC ACID: CPT

## 2018-10-11 PROCEDURE — G0463 HOSPITAL OUTPT CLINIC VISIT: HCPCS | Performed by: INTERNAL MEDICINE

## 2018-10-11 PROCEDURE — 99214 OFFICE O/P EST MOD 30 MIN: CPT | Performed by: INTERNAL MEDICINE

## 2018-10-11 PROCEDURE — 88189 FLOWCYTOMETRY/READ 16 & >: CPT

## 2018-10-11 PROCEDURE — 88185 FLOWCYTOMETRY/TC ADD-ON: CPT

## 2018-10-11 NOTE — PROGRESS NOTES
John Tran is a 66year old male. HPI:   1. Essential hypertension with goal blood pressure less than 140/90    Patient has been following low salt diet and has been taking anti-hypertensive prescriptions as prescribed.  Blood pressure has been check TABLET(10 MG) BY MOUTH EVERY DAY Disp: 90 tablet Rfl: 1   Atorvastatin Calcium 80 MG Oral Tab Take 1 tablet (80 mg total) by mouth daily. Disp: 90 tablet Rfl: 3   folic acid 1 MG Oral Tab Take by mouth daily.  Disp:  Rfl:    Pantoprazole Sodium (PROTONIX) 4 dysuria/hematuria or change in  Urination  MS: Denies muscle aches, cramps or weakness  PSYCH: Denies depression, anxiety, suicidal thoughts  NEURO: denies headaches/ visual changes/ weakness    EXAM:   /78 mmHg  Pulse 81  Temp(Src) 98.6 °F (37 °C) ( platelets with high MCV and elevated LFTs in the past  Has NOT cut drinks from 4-6 daily Reviewed recent low platelet count and reduced HG with elevated LFTs.  Advised again to stop drinking but he states\" I'm not sure I'm able\" Discussed stopping ETOH fu

## 2018-10-15 ENCOUNTER — TELEPHONE (OUTPATIENT)
Dept: GASTROENTEROLOGY | Facility: CLINIC | Age: 73
End: 2018-10-15

## 2018-10-15 NOTE — TELEPHONE ENCOUNTER
Pt contacted and reviewed EGD prep instructions including date, arrival time, and location. He is aware he is to have a  to and from the procedure, NPO at midnight, and take meds w/ small sip of water. No further questions or concerns at this time.

## 2018-10-17 ENCOUNTER — HOSPITAL ENCOUNTER (OUTPATIENT)
Facility: HOSPITAL | Age: 73
Setting detail: HOSPITAL OUTPATIENT SURGERY
Discharge: HOME | End: 2018-10-17
Attending: INTERNAL MEDICINE | Admitting: INTERNAL MEDICINE
Payer: MEDICARE

## 2018-10-17 ENCOUNTER — ANESTHESIA EVENT (OUTPATIENT)
Dept: ENDOSCOPY | Facility: HOSPITAL | Age: 73
End: 2018-10-17
Payer: MEDICARE

## 2018-10-17 ENCOUNTER — ANESTHESIA (OUTPATIENT)
Dept: ENDOSCOPY | Facility: HOSPITAL | Age: 73
End: 2018-10-17
Payer: MEDICARE

## 2018-10-17 VITALS
HEIGHT: 70 IN | WEIGHT: 170 LBS | BODY MASS INDEX: 24.34 KG/M2 | DIASTOLIC BLOOD PRESSURE: 73 MMHG | SYSTOLIC BLOOD PRESSURE: 121 MMHG | HEART RATE: 52 BPM | RESPIRATION RATE: 19 BRPM | OXYGEN SATURATION: 97 %

## 2018-10-17 DIAGNOSIS — K74.60 HEPATIC CIRRHOSIS, UNSPECIFIED HEPATIC CIRRHOSIS TYPE, UNSPECIFIED WHETHER ASCITES PRESENT (HCC): ICD-10-CM

## 2018-10-17 DIAGNOSIS — Z87.19 HISTORY OF ESOPHAGEAL VARICES: ICD-10-CM

## 2018-10-17 PROCEDURE — 0DB78ZX EXCISION OF STOMACH, PYLORUS, VIA NATURAL OR ARTIFICIAL OPENING ENDOSCOPIC, DIAGNOSTIC: ICD-10-PCS | Performed by: INTERNAL MEDICINE

## 2018-10-17 PROCEDURE — 0DB48ZX EXCISION OF ESOPHAGOGASTRIC JUNCTION, VIA NATURAL OR ARTIFICIAL OPENING ENDOSCOPIC, DIAGNOSTIC: ICD-10-PCS | Performed by: INTERNAL MEDICINE

## 2018-10-17 PROCEDURE — 43239 EGD BIOPSY SINGLE/MULTIPLE: CPT | Performed by: INTERNAL MEDICINE

## 2018-10-17 PROCEDURE — 0DB98ZX EXCISION OF DUODENUM, VIA NATURAL OR ARTIFICIAL OPENING ENDOSCOPIC, DIAGNOSTIC: ICD-10-PCS | Performed by: INTERNAL MEDICINE

## 2018-10-17 RX ORDER — SODIUM CHLORIDE, SODIUM LACTATE, POTASSIUM CHLORIDE, CALCIUM CHLORIDE 600; 310; 30; 20 MG/100ML; MG/100ML; MG/100ML; MG/100ML
INJECTION, SOLUTION INTRAVENOUS CONTINUOUS
Status: DISCONTINUED | OUTPATIENT
Start: 2018-10-17 | End: 2018-10-17

## 2018-10-17 RX ORDER — LIDOCAINE HYDROCHLORIDE 10 MG/ML
INJECTION, SOLUTION EPIDURAL; INFILTRATION; INTRACAUDAL; PERINEURAL AS NEEDED
Status: DISCONTINUED | OUTPATIENT
Start: 2018-10-17 | End: 2018-10-17 | Stop reason: SURG

## 2018-10-17 RX ORDER — NALOXONE HYDROCHLORIDE 0.4 MG/ML
80 INJECTION, SOLUTION INTRAMUSCULAR; INTRAVENOUS; SUBCUTANEOUS AS NEEDED
Status: DISCONTINUED | OUTPATIENT
Start: 2018-10-17 | End: 2018-10-17

## 2018-10-17 RX ADMIN — SODIUM CHLORIDE, SODIUM LACTATE, POTASSIUM CHLORIDE, CALCIUM CHLORIDE: 600; 310; 30; 20 INJECTION, SOLUTION INTRAVENOUS at 09:45:00

## 2018-10-17 RX ADMIN — SODIUM CHLORIDE, SODIUM LACTATE, POTASSIUM CHLORIDE, CALCIUM CHLORIDE: 600; 310; 30; 20 INJECTION, SOLUTION INTRAVENOUS at 09:32:00

## 2018-10-17 RX ADMIN — LIDOCAINE HYDROCHLORIDE 25 MG: 10 INJECTION, SOLUTION EPIDURAL; INFILTRATION; INTRACAUDAL; PERINEURAL at 09:32:00

## 2018-10-17 NOTE — H&P
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

## 2018-10-17 NOTE — ANESTHESIA PREPROCEDURE EVALUATION
Anesthesia PreOp Note    HPI:     Nazanin Goldstein is a 75year old male who presents for preoperative consultation requested by: Lalo Strauss MD    Date of Surgery: 10/17/2018    Procedure(s):  ESOPHAGOGASTRODUODENOSCOPY (EGD)  Indication: History of hyperplasia)         Date Noted: 08/25/2014      Varicocele         Date Noted: 08/25/2014      Hydrocele         Date Noted: 08/25/2014      Nocturia         Date Noted: 08/25/2014      Postsurgical aortocoronary bypass status         Date Noted: 08/23/20 Succinate ER 50 MG Oral Tablet 24 Hr Take 1 tablet (50 mg total) by mouth once daily. Disp: 90 tablet Rfl: 3 Taking   aspirin 325 MG Oral Tab Take 325 mg by mouth daily.  Disp:  Rfl:  Taking   Vitamin B-1 (VITAMIN B1) 100 MG Oral Tab Take 100 mg by mouth da Transfusions: Not Asked        Caffeine Concern: Yes          coffee        Occupational Exposure: Not Asked        Hobby Hazards: Not Asked        Sleep Concern: Not Asked        Stress Concern: Not Asked        Weight Concern: Not Asked        Special Lion Esteves teeth        Anesthesia Plan:   ASA:  2  Plan:   MAC  Informed Consent Plan and Risks Discussed With:  Patient      I have informed Subha St and/or legal guardian or family member of the nature of the anesthetic plan, benefits, risks including poss

## 2018-10-17 NOTE — ANESTHESIA POSTPROCEDURE EVALUATION
Patient: Ebonie Merino    Procedure Summary     Date:  10/17/18 Room / Location:  [facility] ENDOSCOPY 01 / [facility] ENDOSCOPY    Anesthesia Start:  0932 Anesthesia Stop:  7801    Procedure:  ESOPHAGOGASTRODUODENOSCOPY (EGD) (N/A ) Diagnosis:       History of esoph

## 2018-10-17 NOTE — OPERATIVE REPORT
ESOPHAGOGASTRODUODENOSCOPY (EGD) REPORT    Nazanin Goldstein     1945 Age 75year old   PCP Katrin Siddiqui MD Endoscopist Judi Bro MD     Date of procedure: 10/17/18    Procedure: EGD w/cold biopsy and control of bleeding    Pre-o a 5mm sessile whitish polyp was seen and removed by biopsy polypectomy. Persistent oozing from polypectomy site controlled with a single endoclip placement. Impression:  1. No esophageal or gastric varices seen in this patient with cirrhosis.   2. Small

## 2018-10-21 ENCOUNTER — TELEPHONE (OUTPATIENT)
Dept: GASTROENTEROLOGY | Facility: CLINIC | Age: 73
End: 2018-10-21

## 2018-10-21 NOTE — TELEPHONE ENCOUNTER
Late entry note d/w pt--->path shows no Lange's, no Hpylori. Tubulovillous polyp removed from duodenum. Discussed possibility of VCE for further eval, but with his ongoing etoh use, cirrhosis, high risk for future procedures.  They want to hold off which

## 2018-10-22 ENCOUNTER — TELEPHONE (OUTPATIENT)
Dept: GASTROENTEROLOGY | Facility: CLINIC | Age: 73
End: 2018-10-22

## 2018-10-22 NOTE — TELEPHONE ENCOUNTER
Oren Foley MD  [facility] Clinical Staff             Recall EGD in 2 yrs      Letter mailed out to pt today. EGD recall entered for 2 years. Snapshot updated.

## 2018-10-22 NOTE — PROGRESS NOTES
[facility]  [address] [facility], [address]  [address]  [facility]  [phone] ([phone]) unspecified hyperlipidemia     drug therapy   • Perforated ear drum    • Tubular adenoma of colon 5/2016    Repeat in 5/2019   • Unspecified essential hypertension     drug therapy      Past Surgical History:   Procedure Laterality Date   • CABG  07/2011 by mouth once daily. , Disp: 90 tablet, Rfl: 3  •  aspirin 325 MG Oral Tab, Take 325 mg by mouth daily. , Disp: , Rfl:   •  Vitamin B-1 (VITAMIN B1) 100 MG Oral Tab, Take 100 mg by mouth daily. , Disp: , Rfl:     Exam:  Vitals per chart  Gen: NAD  HEENT: Anic

## 2018-10-26 ENCOUNTER — APPOINTMENT (OUTPATIENT)
Dept: HEMATOLOGY/ONCOLOGY | Facility: HOSPITAL | Age: 73
End: 2018-10-26
Attending: INTERNAL MEDICINE
Payer: MEDICARE

## 2018-11-01 ENCOUNTER — OFFICE VISIT (OUTPATIENT)
Dept: AUDIOLOGY | Facility: CLINIC | Age: 73
End: 2018-11-01

## 2018-11-01 DIAGNOSIS — H90.3 SENSORINEURAL HEARING LOSS, BILATERAL: Primary | ICD-10-CM

## 2018-11-01 PROCEDURE — 92593 HEARING AID CHECK, BOTH EARS: CPT | Performed by: AUDIOLOGIST

## 2018-11-02 NOTE — PROGRESS NOTES
HEARING AID FOLLOW-UP    Umesh Kothari  7/31/1945  [id number]    Pt was seen for a six months follow-up. PT noted decreased hearing. Listening check indicated dead left aid and weak right aid. Aids were in need of cleaning.   Cleaned and suctioned a

## 2018-12-05 ENCOUNTER — LAB ENCOUNTER (OUTPATIENT)
Dept: LAB | Age: 73
End: 2018-12-05
Attending: INTERNAL MEDICINE
Payer: MEDICARE

## 2018-12-05 ENCOUNTER — OFFICE VISIT (OUTPATIENT)
Dept: INTERNAL MEDICINE CLINIC | Facility: CLINIC | Age: 73
End: 2018-12-05
Payer: MEDICARE

## 2018-12-05 VITALS
RESPIRATION RATE: 16 BRPM | SYSTOLIC BLOOD PRESSURE: 121 MMHG | HEART RATE: 67 BPM | DIASTOLIC BLOOD PRESSURE: 73 MMHG | HEIGHT: 71 IN | WEIGHT: 173 LBS | BODY MASS INDEX: 24.22 KG/M2

## 2018-12-05 DIAGNOSIS — F32.1 CURRENT MODERATE EPISODE OF MAJOR DEPRESSIVE DISORDER WITHOUT PRIOR EPISODE (HCC): ICD-10-CM

## 2018-12-05 DIAGNOSIS — K70.31 ALCOHOLIC CIRRHOSIS OF LIVER WITH ASCITES (HCC): ICD-10-CM

## 2018-12-05 DIAGNOSIS — I10 ESSENTIAL HYPERTENSION WITH GOAL BLOOD PRESSURE LESS THAN 140/90: ICD-10-CM

## 2018-12-05 DIAGNOSIS — F10.10 ALCOHOL ABUSE: ICD-10-CM

## 2018-12-05 DIAGNOSIS — I10 ESSENTIAL HYPERTENSION WITH GOAL BLOOD PRESSURE LESS THAN 140/90: Primary | ICD-10-CM

## 2018-12-05 DIAGNOSIS — N62 GYNECOMASTIA, MALE: ICD-10-CM

## 2018-12-05 PROCEDURE — 85025 COMPLETE CBC W/AUTO DIFF WBC: CPT

## 2018-12-05 PROCEDURE — G0463 HOSPITAL OUTPT CLINIC VISIT: HCPCS | Performed by: INTERNAL MEDICINE

## 2018-12-05 PROCEDURE — 99214 OFFICE O/P EST MOD 30 MIN: CPT | Performed by: INTERNAL MEDICINE

## 2018-12-05 PROCEDURE — 80053 COMPREHEN METABOLIC PANEL: CPT

## 2018-12-05 PROCEDURE — 36415 COLL VENOUS BLD VENIPUNCTURE: CPT

## 2018-12-05 NOTE — PROGRESS NOTES
Tawana Mtz is a 66year old male. HPI:   1. Essential hypertension with goal blood pressure less than 140/90    Patient has been following low salt diet and has been taking anti-hypertensive prescriptions as prescribed.  Blood pressure has been check MOUTH DAILY Disp: 90 tablet Rfl: 0   aspirin 325 MG Oral Tab Take 325 mg by mouth daily. Disp:  Rfl:    Vitamin B-1 (VITAMIN B1) 100 MG Oral Tab Take 100 mg by mouth daily.  Disp:  Rfl:    fluorouracil (EFUDEX) 5 % Apply Externally Cream  Disp:  Rfl: 0   fu m)  Wt 194 lb 1.6 oz (88.043 kg)  BMI 27.08 kg/m2    GENERAL: well developed, well nourished, in no apparent distress  SKIN: no rashes,no suspicious lesions  HEENT: atraumatic, normocephalic, throat is clear  BREASTS: some swelling of tissue under areola w be increased to help clear the fluid w/o possibly aggravating the gynecomastia. May need surgical evaluation iof hepatology does notbthink this bilateral problem is med/ETOH related.      Wt Readings from Last 6 Encounters:  12/05/18 : 173 lb (78.5 kg)  10/

## 2019-01-09 ENCOUNTER — LAB ENCOUNTER (OUTPATIENT)
Dept: LAB | Facility: HOSPITAL | Age: 74
End: 2019-01-09
Attending: NURSE PRACTITIONER
Payer: MEDICARE

## 2019-01-09 ENCOUNTER — HOSPITAL ENCOUNTER (OUTPATIENT)
Dept: GENERAL RADIOLOGY | Facility: HOSPITAL | Age: 74
Discharge: HOME | End: 2019-01-09
Attending: NURSE PRACTITIONER
Payer: MEDICARE

## 2019-01-09 DIAGNOSIS — R06.02 SOB (SHORTNESS OF BREATH): ICD-10-CM

## 2019-01-09 LAB
ALBUMIN SERPL BCP-MCNC: 4.1 G/DL (ref 3.5–4.8)
ALBUMIN/GLOB SERPL: 1 {RATIO} (ref 1–2)
ALP SERPL-CCNC: 288 U/L (ref 32–100)
ALT SERPL-CCNC: 24 U/L (ref 17–63)
AMMONIA PLAS-MCNC: 33 UG/DL (ref 28.2–80.4)
ANION GAP SERPL CALC-SCNC: 14 MMOL/L (ref 0–18)
AST SERPL-CCNC: 34 U/L (ref 15–41)
BASOPHILS # BLD: 0 K/UL (ref 0–0.2)
BASOPHILS NFR BLD: 0 %
BILIRUB SERPL-MCNC: 2 MG/DL (ref 0.3–1.2)
BNP SERPL-MCNC: 286 PG/ML (ref 0–100)
BUN SERPL-MCNC: 17 MG/DL (ref 8–20)
BUN/CREAT SERPL: 14.8 (ref 10–20)
CALCIUM SERPL-MCNC: 9.3 MG/DL (ref 8.5–10.5)
CHLORIDE SERPL-SCNC: 94 MMOL/L (ref 95–110)
CO2 SERPL-SCNC: 23 MMOL/L (ref 22–32)
CREAT SERPL-MCNC: 1.15 MG/DL (ref 0.5–1.5)
EOSINOPHIL # BLD: 0.2 K/UL (ref 0–0.7)
EOSINOPHIL NFR BLD: 3 %
ERYTHROCYTE [DISTWIDTH] IN BLOOD BY AUTOMATED COUNT: 15 % (ref 11–15)
GLOBULIN PLAS-MCNC: 4.3 G/DL (ref 2.5–3.7)
GLUCOSE SERPL-MCNC: 105 MG/DL (ref 70–99)
HCT VFR BLD AUTO: 33.8 % (ref 41–52)
HGB BLD-MCNC: 11.4 G/DL (ref 13.5–17.5)
LYMPHOCYTES # BLD: 1 K/UL (ref 1–4)
LYMPHOCYTES NFR BLD: 19 %
MCH RBC QN AUTO: 31.1 PG (ref 27–32)
MCHC RBC AUTO-ENTMCNC: 33.6 G/DL (ref 32–37)
MCV RBC AUTO: 92.7 FL (ref 80–100)
MONOCYTES # BLD: 0.5 K/UL (ref 0–1)
MONOCYTES NFR BLD: 10 %
NEUTROPHILS # BLD AUTO: 3.6 K/UL (ref 1.8–7.7)
NEUTROPHILS NFR BLD: 68 %
OSMOLALITY UR CALC.SUM OF ELEC: 274 MOSM/KG (ref 275–295)
PATIENT FASTING: NO
PLATELET # BLD AUTO: 160 K/UL (ref 140–400)
PMV BLD AUTO: 7.9 FL (ref 7.4–10.3)
POTASSIUM SERPL-SCNC: 3.6 MMOL/L (ref 3.3–5.1)
PROT SERPL-MCNC: 8.4 G/DL (ref 5.9–8.4)
RBC # BLD AUTO: 3.65 M/UL (ref 4.5–5.9)
SODIUM SERPL-SCNC: 131 MMOL/L (ref 136–144)
WBC # BLD AUTO: 5.4 K/UL (ref 4–11)

## 2019-01-09 PROCEDURE — 82140 ASSAY OF AMMONIA: CPT

## 2019-01-09 PROCEDURE — 85025 COMPLETE CBC W/AUTO DIFF WBC: CPT

## 2019-01-09 PROCEDURE — 36415 COLL VENOUS BLD VENIPUNCTURE: CPT

## 2019-01-09 PROCEDURE — 80053 COMPREHEN METABOLIC PANEL: CPT

## 2019-01-09 PROCEDURE — 71046 X-RAY EXAM CHEST 2 VIEWS: CPT | Performed by: NURSE PRACTITIONER

## 2019-01-09 PROCEDURE — 83880 ASSAY OF NATRIURETIC PEPTIDE: CPT

## 2019-01-28 NOTE — PROGRESS NOTES
[facility]  [address] [facility], [address]  [address]  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 , Disp: 90 tablet, Rfl: 1  •  folic acid 1 MG Oral Tab, Take 1 tablet (1 mg total) by mouth daily. , Disp: 90 tablet, Rfl: 1  •  Metoprolol Succinate ER 50 MG up ~ 3 months    Jamey Hinds MD  Director of Hepatology  Medical Director of [address] [place] [address] of [facility] [place] [address], 7th floor, [place], [place],  [facility] (office)  [phone]

## 2019-02-19 ENCOUNTER — HOSPITAL ENCOUNTER (OUTPATIENT)
Dept: ULTRASOUND IMAGING | Age: 74
Discharge: HOME | End: 2019-02-19
Attending: INTERNAL MEDICINE
Payer: MEDICARE

## 2019-02-19 DIAGNOSIS — K70.30 ALCOHOLIC CIRRHOSIS OF LIVER WITHOUT ASCITES (HCC): ICD-10-CM

## 2019-02-19 PROCEDURE — 76705 ECHO EXAM OF ABDOMEN: CPT | Performed by: INTERNAL MEDICINE

## 2019-04-10 ENCOUNTER — NURSE TRIAGE (OUTPATIENT)
Dept: OTHER | Age: 74
End: 2019-04-10

## 2019-04-10 ENCOUNTER — OFFICE VISIT (OUTPATIENT)
Dept: INTERNAL MEDICINE CLINIC | Facility: CLINIC | Age: 74
End: 2019-04-10
Payer: MEDICARE

## 2019-04-10 ENCOUNTER — APPOINTMENT (OUTPATIENT)
Dept: LAB | Age: 74
End: 2019-04-10
Attending: INTERNAL MEDICINE
Payer: MEDICARE

## 2019-04-10 VITALS — BODY MASS INDEX: 26.04 KG/M2 | HEIGHT: 71 IN | WEIGHT: 186 LBS | RESPIRATION RATE: 16 BRPM

## 2019-04-10 DIAGNOSIS — I10 ESSENTIAL HYPERTENSION WITH GOAL BLOOD PRESSURE LESS THAN 140/90: Primary | ICD-10-CM

## 2019-04-10 DIAGNOSIS — R25.2 LEG CRAMPS: ICD-10-CM

## 2019-04-10 DIAGNOSIS — B35.6 TINEA CRURIS: ICD-10-CM

## 2019-04-10 DIAGNOSIS — F10.10 ALCOHOL ABUSE: ICD-10-CM

## 2019-04-10 PROCEDURE — 83735 ASSAY OF MAGNESIUM: CPT

## 2019-04-10 PROCEDURE — 36415 COLL VENOUS BLD VENIPUNCTURE: CPT

## 2019-04-10 PROCEDURE — G0463 HOSPITAL OUTPT CLINIC VISIT: HCPCS | Performed by: INTERNAL MEDICINE

## 2019-04-10 PROCEDURE — 80053 COMPREHEN METABOLIC PANEL: CPT

## 2019-04-10 PROCEDURE — 99214 OFFICE O/P EST MOD 30 MIN: CPT | Performed by: INTERNAL MEDICINE

## 2019-04-10 NOTE — PROGRESS NOTES
Ramon Dominguez is a 66year old male. HPI:   1. Essential hypertension with goal blood pressure less than 140/90    Patient has been following low salt diet and has been taking anti-hypertensive prescriptions as prescribed.  Blood pressure has been check Tab Take 20 mg by mouth. Disp:  Rfl:    Ferrous Sulfate 325 (65 Fe) MG Oral Tab Take 325 mg by mouth. Disp:  Rfl:    docusate sodium (COLACE) 100 MG Oral Cap Take 100 mg by mouth.  Disp:  Rfl:       Past Medical History   Diagnosis Date   • Coronary artery Axilla clear.   EARS: Right cerumen impaction  NECK: supple,no adenopathy,no bruits  LUNGS: clear to auscultation  CARDIO: RRR without murmur  GI: good BS's,no masses, HSM or tenderness  :Redness to testicles with some moistness seen and redness of both i gold bond or other drying powder to keep area dry and cool. ll if symptoms persist. May need to see dermatology. The patient indicates understanding of these issues and agrees to the plan.     The patient is asked to return in 4 months

## 2019-04-10 NOTE — TELEPHONE ENCOUNTER
Action Requested: Summary for Provider     []  Critical Lab, Recommendations Needed  [] Need Additional Advice  []   FYI    []   Need Orders  [] Need Medications Sent to Pharmacy  []  Other     SUMMARY: appt made with Dr MEZA today.     Reason for call: Rash

## 2019-04-11 ENCOUNTER — TELEPHONE (OUTPATIENT)
Dept: GASTROENTEROLOGY | Facility: CLINIC | Age: 74
End: 2019-04-11

## 2019-04-11 NOTE — TELEPHONE ENCOUNTER
----- Message from Uriel Alvarado RN sent at 5/13/2016  1:52 PM CDT -----  Regarding: recall colon  Recall colon in 3 years per SDK.  Colon done 5/11/2016

## 2019-04-12 ENCOUNTER — OFFICE VISIT (OUTPATIENT)
Dept: SURGERY | Facility: CLINIC | Age: 74
End: 2019-04-12
Payer: MEDICARE

## 2019-04-12 VITALS
SYSTOLIC BLOOD PRESSURE: 119 MMHG | BODY MASS INDEX: 26 KG/M2 | WEIGHT: 186 LBS | HEART RATE: 86 BPM | DIASTOLIC BLOOD PRESSURE: 78 MMHG

## 2019-04-12 DIAGNOSIS — B35.6 TINEA CRURIS: ICD-10-CM

## 2019-04-12 DIAGNOSIS — R35.1 NOCTURIA: ICD-10-CM

## 2019-04-12 DIAGNOSIS — R97.20 ELEVATED PSA: ICD-10-CM

## 2019-04-12 DIAGNOSIS — R21 GROIN RASH: Primary | ICD-10-CM

## 2019-04-12 DIAGNOSIS — R35.0 BENIGN PROSTATIC HYPERPLASIA WITH URINARY FREQUENCY: ICD-10-CM

## 2019-04-12 DIAGNOSIS — N40.1 BENIGN PROSTATIC HYPERPLASIA WITH URINARY FREQUENCY: ICD-10-CM

## 2019-04-12 PROCEDURE — 99214 OFFICE O/P EST MOD 30 MIN: CPT | Performed by: UROLOGY

## 2019-04-12 PROCEDURE — G0463 HOSPITAL OUTPT CLINIC VISIT: HCPCS | Performed by: UROLOGY

## 2019-04-12 RX ORDER — FLUCONAZOLE 100 MG/1
TABLET ORAL
Qty: 11 TABLET | Refills: 1 | Status: ON HOLD | OUTPATIENT
Start: 2019-04-12 | End: 2019-04-18

## 2019-04-12 NOTE — PATIENT INSTRUCTIONS
Wilver Youngblood M.D.      1.  Take fluconazole/Diflucan 100 mg tablets, 2 tablets on the first day, then 1 tablet daily for 10 days.     2.  If after a few days to several days you do not notice an improvement OR if the problem

## 2019-04-12 NOTE — PROGRESS NOTES
HPI:    Patient ID: Nazanin Goldstein is a 75year old male. HPI   History provided by patient and his wife, Ceasar Anderson. Groin Rash  Problem started 6 days ago.  Patient complains of a painful and very erythematous rash located on his right groin and scro that is on diuretic for cirrhosis which contributes to trouble starting to urinate. BPH  Chronic. The patient is not taking any prostate medication. He denies any pelvic pain or discomfort. He feels that the problem is stable.        CHART REVIEW  PSA el tablet Rfl: 3   Pantoprazole Sodium 40 MG Oral Tab EC Take 1 tablet (40 mg total) by mouth once daily. Disp: 90 tablet Rfl: 1   folic acid 1 MG Oral Tab Take 1 tablet (1 mg total) by mouth daily.  Disp: 90 tablet Rfl: 1   Metoprolol Succinate ER 50 MG Oral Father    • Heart Disorder Mother         cva   • Stroke Mother 67        cause of death   • Cancer Mother 76        cancer involving the bone   • Heart Disorder Sister         CAD (cause of death)   • Clotting Disorder Sister         history of DVT   • Ge respiratory distress. Genitourinary:   Genitourinary Comments: Patient politely declines prostate exam today. On 6/12/18 SILVIA, prostate 2-3+ enlarged, no palpable nodules or indurations.   Scrotum enlarged consistent with bilateral hydrocele; anterior scro 100 mg tablets, 2 tablets on the first day, then 1 tablet daily for 10 days. I also recommend patient change and wash his underwear daily. He decides to follow up as needed.  I emphasized to patient that although I believe it is more likely a fungal infecti days to several days you do not notice an improvement OR if the problem seems to be worsening during the next few days, I would then want you to see 1 of the [facility] dermatologist on an urgent basis; I would want to to call our urology office nurse

## 2019-04-15 ENCOUNTER — APPOINTMENT (OUTPATIENT)
Dept: CT IMAGING | Facility: HOSPITAL | Age: 74
DRG: 728 | End: 2019-04-15
Attending: EMERGENCY MEDICINE
Payer: MEDICARE

## 2019-04-15 ENCOUNTER — APPOINTMENT (OUTPATIENT)
Dept: ULTRASOUND IMAGING | Facility: HOSPITAL | Age: 74
DRG: 728 | End: 2019-04-15
Attending: EMERGENCY MEDICINE
Payer: MEDICARE

## 2019-04-15 ENCOUNTER — HOSPITAL ENCOUNTER (INPATIENT)
Facility: HOSPITAL | Age: 74
LOS: 5 days | Discharge: HOME HEALTH | DRG: 728 | End: 2019-04-20
Attending: EMERGENCY MEDICINE | Admitting: HOSPITALIST
Payer: MEDICARE

## 2019-04-15 DIAGNOSIS — N49.2 CELLULITIS, SCROTUM: Primary | ICD-10-CM

## 2019-04-15 DIAGNOSIS — N43.3 HYDROCELE, UNSPECIFIED HYDROCELE TYPE: ICD-10-CM

## 2019-04-15 DIAGNOSIS — B35.6 TINEA CRURIS: ICD-10-CM

## 2019-04-15 PROCEDURE — 76870 US EXAM SCROTUM: CPT | Performed by: EMERGENCY MEDICINE

## 2019-04-15 PROCEDURE — 93975 VASCULAR STUDY: CPT | Performed by: EMERGENCY MEDICINE

## 2019-04-15 PROCEDURE — 99223 1ST HOSP IP/OBS HIGH 75: CPT | Performed by: HOSPITALIST

## 2019-04-15 PROCEDURE — 99222 1ST HOSP IP/OBS MODERATE 55: CPT | Performed by: NURSE PRACTITIONER

## 2019-04-15 PROCEDURE — 72193 CT PELVIS W/DYE: CPT | Performed by: EMERGENCY MEDICINE

## 2019-04-15 RX ORDER — FUROSEMIDE 40 MG/1
40 TABLET ORAL 2 TIMES DAILY
Status: DISCONTINUED | OUTPATIENT
Start: 2019-04-15 | End: 2019-04-18

## 2019-04-15 RX ORDER — EPLERENONE 25 MG/1
50 TABLET, FILM COATED ORAL DAILY
Status: DISCONTINUED | OUTPATIENT
Start: 2019-04-15 | End: 2019-04-19

## 2019-04-15 RX ORDER — ACETAMINOPHEN 325 MG/1
650 TABLET ORAL EVERY 4 HOURS PRN
Status: DISCONTINUED | OUTPATIENT
Start: 2019-04-15 | End: 2019-04-20

## 2019-04-15 RX ORDER — METOPROLOL SUCCINATE 50 MG/1
50 TABLET, EXTENDED RELEASE ORAL
Status: DISCONTINUED | OUTPATIENT
Start: 2019-04-15 | End: 2019-04-20

## 2019-04-15 RX ORDER — MORPHINE SULFATE 2 MG/ML
2 INJECTION, SOLUTION INTRAMUSCULAR; INTRAVENOUS EVERY 2 HOUR PRN
Status: DISCONTINUED | OUTPATIENT
Start: 2019-04-15 | End: 2019-04-20

## 2019-04-15 RX ORDER — ATORVASTATIN CALCIUM 40 MG/1
80 TABLET, FILM COATED ORAL
Status: DISCONTINUED | OUTPATIENT
Start: 2019-04-15 | End: 2019-04-20

## 2019-04-15 RX ORDER — SODIUM CHLORIDE 0.9 % (FLUSH) 0.9 %
3 SYRINGE (ML) INJECTION AS NEEDED
Status: DISCONTINUED | OUTPATIENT
Start: 2019-04-15 | End: 2019-04-20

## 2019-04-15 RX ORDER — PANTOPRAZOLE SODIUM 40 MG/1
40 TABLET, DELAYED RELEASE ORAL
Status: DISCONTINUED | OUTPATIENT
Start: 2019-04-15 | End: 2019-04-20

## 2019-04-15 RX ORDER — ONDANSETRON 2 MG/ML
4 INJECTION INTRAMUSCULAR; INTRAVENOUS EVERY 6 HOURS PRN
Status: DISCONTINUED | OUTPATIENT
Start: 2019-04-15 | End: 2019-04-20

## 2019-04-15 RX ORDER — HYDROCODONE BITARTRATE AND ACETAMINOPHEN 5; 325 MG/1; MG/1
1 TABLET ORAL EVERY 4 HOURS PRN
Status: DISCONTINUED | OUTPATIENT
Start: 2019-04-15 | End: 2019-04-18

## 2019-04-15 RX ORDER — LISINOPRIL 5 MG/1
2.5 TABLET ORAL DAILY
Status: DISCONTINUED | OUTPATIENT
Start: 2019-04-15 | End: 2019-04-18

## 2019-04-15 RX ORDER — MORPHINE SULFATE 2 MG/ML
1 INJECTION, SOLUTION INTRAMUSCULAR; INTRAVENOUS EVERY 2 HOUR PRN
Status: DISCONTINUED | OUTPATIENT
Start: 2019-04-15 | End: 2019-04-20

## 2019-04-15 RX ORDER — ACETAMINOPHEN 325 MG/1
650 TABLET ORAL EVERY 6 HOURS PRN
Status: DISCONTINUED | OUTPATIENT
Start: 2019-04-15 | End: 2019-04-18

## 2019-04-15 RX ORDER — MORPHINE SULFATE 4 MG/ML
4 INJECTION, SOLUTION INTRAMUSCULAR; INTRAVENOUS ONCE
Status: COMPLETED | OUTPATIENT
Start: 2019-04-15 | End: 2019-04-15

## 2019-04-15 RX ORDER — FOLIC ACID 1 MG/1
1 TABLET ORAL DAILY
Status: DISCONTINUED | OUTPATIENT
Start: 2019-04-15 | End: 2019-04-20

## 2019-04-15 RX ORDER — HYDROCODONE BITARTRATE AND ACETAMINOPHEN 5; 325 MG/1; MG/1
2 TABLET ORAL EVERY 4 HOURS PRN
Status: DISCONTINUED | OUTPATIENT
Start: 2019-04-15 | End: 2019-04-18

## 2019-04-15 RX ORDER — MORPHINE SULFATE 4 MG/ML
2 INJECTION, SOLUTION INTRAMUSCULAR; INTRAVENOUS ONCE
Status: DISCONTINUED | OUTPATIENT
Start: 2019-04-15 | End: 2019-04-15

## 2019-04-15 RX ORDER — FLUCONAZOLE 2 MG/ML
200 INJECTION, SOLUTION INTRAVENOUS EVERY 24 HOURS
Status: DISCONTINUED | OUTPATIENT
Start: 2019-04-15 | End: 2019-04-16

## 2019-04-15 RX ORDER — MORPHINE SULFATE 4 MG/ML
2 INJECTION, SOLUTION INTRAMUSCULAR; INTRAVENOUS ONCE
Status: COMPLETED | OUTPATIENT
Start: 2019-04-15 | End: 2019-04-15

## 2019-04-15 RX ORDER — MORPHINE SULFATE 4 MG/ML
4 INJECTION, SOLUTION INTRAMUSCULAR; INTRAVENOUS EVERY 2 HOUR PRN
Status: DISCONTINUED | OUTPATIENT
Start: 2019-04-15 | End: 2019-04-20

## 2019-04-15 RX ORDER — SODIUM CHLORIDE 9 MG/ML
INJECTION, SOLUTION INTRAVENOUS CONTINUOUS
Status: DISCONTINUED | OUTPATIENT
Start: 2019-04-15 | End: 2019-04-16

## 2019-04-15 NOTE — CONSULTS
[facility] HOSP - [facility]    Report of Consultation    Amy Sultanaer Patient Status:  Inpatient    1945 MRN [id number]   Location [facility] 5SW/SE Attending Lauro Spring MD   Hosp Day # 0 PCP Argentina Conner MD     Date of Admi • Hearing impairment    • Heart attack ([facility]    • High blood pressure    • High cholesterol    • Hx of CABG 2011    times 4   • Knee torn cartilage     open surgery   • Leukemia ([facility] 1983    acute lymphoblastic leukemia; treated with chemotherapy, radia Admission:  fluconazole 100 MG Oral Tab Start with 2 tablets on the first day, then 1 tablet daily for 10 days (Patient taking differently: Take 100 mg by mouth daily.  Start with 2 tablets on the first day, then 1 tablet daily for 10 days  Patient taking 1 Normal range of motion. Cardiovascular: Normal rate. Pulmonary/Chest: Effort normal. No respiratory distress. Abdominal: Soft. He exhibits no distension. Genitourinary:   Genitourinary Comments: Diffuse scrotal erythema and edema.   Skin to area is bilateral scrotal skin thickening with increased vascularity compatible with a diffuse scrotal cellulitis. I can't exclude Emma's gangrene. Correlate clinically. No well-defined intra scrotal fluid collection or abscess.  2. Normal bilateral testes a time with patient with more than 50% of time spent in counseling. Barbara SHEA  [address] [facility], APRN  4/15/2019

## 2019-04-15 NOTE — ED INITIAL ASSESSMENT (HPI)
Itching and bleeding to genital area. Seen by urologist and given antifungal. Pt states \"it has gotten worse. \"

## 2019-04-15 NOTE — ED NOTES
.Orders for admission, patient is aware of plan and ready to go upstairs.  Any questions, please call ED RN [phone]  at [address]

## 2019-04-15 NOTE — PROGRESS NOTES
[facility] Dosing Service    Initial Pharmacokinetic Consult for Vancomycin Dosing     Aj Ng is a 75year old male who is being treated for cellulitis.   Pharmacy has been asked to dose Vancomycin by Dr. Cooper Yang    He is allergic to penicillin

## 2019-04-15 NOTE — ED PROVIDER NOTES
Patient Seen in: [facility] AND [facility] Emergency Department    History   Patient presents with:  Eval-G (genital)    Stated Complaint: groin pain x6 days     HPI    76 yo M with PMH CAD, HTN, HL, ALL presenting for evaluation of scrotal itching/redness for whi TAKE 1 TABLET BY MOUTH AS DIRECTED BY MD   lisinopril 2.5 MG Oral Tab,  Take 1 tablet (2.5 mg total) by mouth daily. furosemide 40 MG Oral Tab,  Take 1 tablet (40 mg total) by mouth 2 (two) times daily.    eplerenone 50 MG Oral Tab,  Take 1 tablet (50 mg HPI.    Physical Exam     ED Triage Vitals [04/15/19 1004]   /69   Pulse 69   Resp 18   Temp 97.5 °F (36.4 °C)   Temp src Oral   SpO2 100 %   O2 Device None (Room air)       Current:/69   Pulse 69   Temp 97.5 °F (36.4 °C) (Oral)   Resp 18   Ht swelling seen throughout the entire scrotum. There is a large left and small right hydrocele. The bilateral hydroceles containing thick peripherally enhancing walls with internal mildly complex fluid measuring up to 17 Hounsfield units.   No soft tissue o testicle with color and pulsed Doppler. Normal epididymal flow. LEFT TESTICLE: Measures 4.0 x 2.5 x 2.6 cm. Normal echogenicity. No masses. EPIDIDYMIS: Normal size and echogenicity.   OTHER: Large left scrotal hydrocele and moderate-sized left scrotal Nirali Vergara and Dr. Mayur Hernandez consulted with recs appreciated.     Disposition and Plan     Clinical Impression:  Cellulitis, scrotum  (primary encounter diagnosis)  Tinea cruris  Hydrocele, unspecified hydrocele type    Disposition:  Admit    Follow-up:  No f

## 2019-04-15 NOTE — CONSULTS
INFECTIOUS DISEASE CONSULT NOTE    Saumya Betts Patient Status:  Inpatient    1945 MRN [id number]   Location [facility] 5SW/SE Attending Estevan Marquez MD   Hosp Day # 0 KASSY Agrawal 2011    times 4   • Knee torn cartilage     open surgery   • Leukemia ([facility] 1983    acute lymphoblastic leukemia; treated with chemotherapy, radiation, no transplant; remission reported after 5 yrs of surveillance   • Myocardial infarct ([facility] 1990   • Obes (ZOFRAN) injection 4 mg, 4 mg, Intravenous, Q6H PRN  •  morphINE sulfate (PF) 2 MG/ML injection 1 mg, 1 mg, Intravenous, Q2H PRN **OR** morphINE sulfate (PF) 2 MG/ML injection 2 mg, 2 mg, Intravenous, Q2H PRN **OR** morphINE sulfate (PF) 4 MG/ML injection lesions extending into the scrotum with raw appearing skin with erythema, mild warmth, +ttp; some involvement of glans penis as well.  Dry skin; no perirectal or thigh extension; no crepitus or fluctuance; mild warmth    Laboratory Data:    Recent Labs   La dermatitis with secondary cellulitis w/o signs of Fourier's or scrotal abscess  # Poor hygiene  # Alcoholic cirrhosis with active use  # h/o ALL s/p chemo/XRT    PLAN:    - continue IV vancomycin  - d/c zosyn  - start IV fluconazole  - local wound care and

## 2019-04-15 NOTE — PLAN OF CARE
Problem: Patient Centered Care  Goal: Patient preferences are identified and integrated in the patient's plan of care  Description  Interventions:  - What would you like us to know as we care for you?  I live at home with my wife  - Provide timely, comple fever/infection during anticipated neutropenic period  Description  INTERVENTIONS  - Monitor WBC  - Administer growth factors as ordered  - Implement neutropenic guidelines  Outcome: Progressing     Patient is alert and oriented. Able to make needs known.

## 2019-04-15 NOTE — H&P
[facility]    PATIENT'S NAME: Si Body   ATTENDING PHYSICIAN: Beltran Garcia MD   PATIENT ACCOUNT#:   [id number]    LOCATION:  [place] [address]  MEDICAL RECORD #:   [id number]       YOB: 1945  ADMISSION DATE:       04/15/ living. REVIEW OF SYSTEMS:  Pain and irritation with increased swelling and erythema with skin sloughing of the scrotum for the last 6 days, per the patient. No fever or chills. No chest pain. No dysuria. Other 12-point review of systems negative. 13:15:10  Job [id number]  FB/

## 2019-04-15 NOTE — ED NOTES
Orders for admission, patient is aware of plan and ready to go upstairs. Any questions, please call ED RN Marbin Rodas at [address]. Pt alert&oriented X4, standby assisted,scrotum redness/swelling , wife at bedside.

## 2019-04-16 PROCEDURE — 99233 SBSQ HOSP IP/OBS HIGH 50: CPT | Performed by: HOSPITALIST

## 2019-04-16 PROCEDURE — 99233 SBSQ HOSP IP/OBS HIGH 50: CPT | Performed by: UROLOGY

## 2019-04-16 RX ORDER — HEPARIN SODIUM 5000 [USP'U]/ML
5000 INJECTION, SOLUTION INTRAVENOUS; SUBCUTANEOUS EVERY 8 HOURS SCHEDULED
Status: DISCONTINUED | OUTPATIENT
Start: 2019-04-16 | End: 2019-04-18

## 2019-04-16 RX ORDER — FLUCONAZOLE 200 MG/1
200 TABLET ORAL DAILY
Status: DISCONTINUED | OUTPATIENT
Start: 2019-04-16 | End: 2019-04-20

## 2019-04-16 RX ORDER — POTASSIUM CHLORIDE 20 MEQ/1
40 TABLET, EXTENDED RELEASE ORAL EVERY 4 HOURS
Status: COMPLETED | OUTPATIENT
Start: 2019-04-16 | End: 2019-04-16

## 2019-04-16 RX ORDER — ASPIRIN 325 MG
325 TABLET ORAL DAILY
Status: DISCONTINUED | OUTPATIENT
Start: 2019-04-17 | End: 2019-04-20

## 2019-04-16 RX ORDER — CEFAZOLIN SODIUM/WATER 2 G/20 ML
2 SYRINGE (ML) INTRAVENOUS EVERY 8 HOURS
Status: DISCONTINUED | OUTPATIENT
Start: 2019-04-16 | End: 2019-04-20

## 2019-04-16 NOTE — PLAN OF CARE
Problem: Patient Centered Care  Goal: Patient preferences are identified and integrated in the patient's plan of care  Description  Interventions:  - What would you like us to know as we care for you?  I live at home with my wife  - Provide timely, comple fever/infection during anticipated neutropenic period  Description  INTERVENTIONS  - Monitor WBC  - Administer growth factors as ordered  - Implement neutropenic guidelines  Outcome: Progressing    Vital sign stable.  Patient voiding per the urinal. No comp

## 2019-04-16 NOTE — PROGRESS NOTES
[facility] ID PROGRESS NOTE    Amy Fuentes Patient Status:  Inpatient    1945 MRN [id number]   Location [facility] 5SW/SE Attending Joanna Geiger MD   Hosp Day # 1 PCP Argentina Conner MD     Subjective:  Tmax 99.6.  States still havi line of esophagus     Gynecomastia, male     Leg cramps     Cellulitis, scrotum     Scrotal abscess     Tinea cruris      ASSESSMENT:    Antibiotics: Vancomycin, fluconazole, day 2  Zosyn    27-year-old male with a history of alcohol abuse with the last dr

## 2019-04-16 NOTE — PLAN OF CARE
Problem: Patient Centered Care  Goal: Patient preferences are identified and integrated in the patient's plan of care  Description  Interventions:  - What would you like us to know as we care for you?  I live at home with my wife  - Provide timely, comple appropriate  Outcome: Progressing  Note:   Medicated for pain to scrotal area     Problem: RISK FOR INFECTION - ADULT  Goal: Absence of fever/infection during anticipated neutropenic period  Description  INTERVENTIONS  - Monitor WBC  - Administer growth fa

## 2019-04-16 NOTE — PROGRESS NOTES
[facility]    Progress Note    Aj Ng Patient Status:  Inpatient    1945 MRN [id number]   Location [facility] 5SW/SE Attending Yonathan Barrera MD   Hosp Day # 1 PCP Tadeo Bazan MD       Subjective:   Nisha Iglesias Oral Daily   • Metoprolol Succinate ER  50 mg Oral Daily   • Pantoprazole Sodium  40 mg Oral Daily       Current PRN Inpatient Meds:      Normal Saline Flush, acetaminophen, ondansetron HCl, morphINE sulfate **OR** morphINE sulfate **OR** morphINE sulfate, hrs. N/A       Imaging/EKG:   Ct Pelvis(contrast Only) (cpt=72193)    Result Date: 4/15/2019  CONCLUSION:   Complex collections within the bilateral nena scrotum, large on the left and small on the right.   These may represent infected or complex hydroceles Certification    Patient will require inpatient services that will reasonably be expected to span two midnight's based on the clinical documentation in H+P.    Based on patients current state of illness, I anticipate that, after discharge, patient will requ

## 2019-04-16 NOTE — PROGRESS NOTES
Ebenezer Antonio is a 75year old male.     HPI:   Patient presents with:  Eval-G (genital)      History provided by patient and review of office records and hospital records    Pain  Location scrotum and right groin; started approximately 4/6/19; pain cons • Cancer Father [de-identified]        prostate (cause of death)   • Diabetes Father    • Heart Disorder Mother         cva   • Stroke Mother 67        cause of death   • Cancer Mother 76        cancer involving the bone   • Heart Disorder Sister         CAD (cause o Metoprolol Succinate ER (Toprol XL) 24 hr tab 50 mg, 50 mg, Oral, Daily  •  Pantoprazole Sodium (PROTONIX) EC tab 40 mg, 40 mg, Oral, Daily    Allergies:    Penicillins                 Comment:Other reaction(s): Unknown  Penicillin G            RASH    Com 6.0 5.0 6.0 8.0   PROUR 30* 30 *  --  Negative 30 * 30 * Negative Negative   GLUUR Negative Negative Negative Negative Negative Negative Negative Negative   KETUR Trace* Negative  --  Trace* Negative Negative Negative Negative   BILUR Negative Negative  -- Doppler. Normal epididymal flow. Of note, there is marked bilateral scrotal skin thickening with increased vascularity compatible with a diffuse scrotal cellulitis. I cannot exclude Emma's gangrene. Correlate clinically.   No well-defined intrascro lymph node, a 1.3 x 1.1 cm bryce left lower periaortic lymph node, and a 1.2 x 1.2 cm left lower periaortic lymph node.  Bladder wall thickening greater than what is expected for under distention, likely due to chronic bladder outlet obstruction from an enla decides to follow up as needed.  I emphasized to patient that although I believe it is more likely a fungal infection of the scrotal skin and right inguinal fold, I am not completely certain of that; if no improvement in his condition after several days on nodules or indurations. He feels this is stable and decides to observe.     4. Nocturia  Patient has current AUA score of 6, mild voiding dysfunction category. The patient is no longer taking tamsulosin since 6/12/2018 office visit.  Patient is on a diuret

## 2019-04-16 NOTE — WOUND PROGRESS NOTE
WOUND CARE NOTE      PLAN   Recommendations:  Dr. Nelsy Estevez and Reymundo Rust currently on consult  Dietary consult for recommendations for nutrition to optimize wound healing    Wound(s)  Location: scrotum  Cleansing  Saline   Topical miconazole powder  Dress

## 2019-04-17 PROCEDURE — 99231 SBSQ HOSP IP/OBS SF/LOW 25: CPT | Performed by: NURSE PRACTITIONER

## 2019-04-17 PROCEDURE — 99233 SBSQ HOSP IP/OBS HIGH 50: CPT | Performed by: HOSPITALIST

## 2019-04-17 RX ORDER — DIPHENHYDRAMINE HCL 25 MG
25 CAPSULE ORAL EVERY 6 HOURS PRN
Status: DISCONTINUED | OUTPATIENT
Start: 2019-04-17 | End: 2019-04-20

## 2019-04-17 RX ORDER — POTASSIUM CHLORIDE 20 MEQ/1
40 TABLET, EXTENDED RELEASE ORAL ONCE
Status: COMPLETED | OUTPATIENT
Start: 2019-04-17 | End: 2019-04-17

## 2019-04-17 RX ORDER — DIPHENHYDRAMINE HYDROCHLORIDE, ZINC ACETATE 2; .1 G/100G; G/100G
1 CREAM TOPICAL 3 TIMES DAILY PRN
Status: DISCONTINUED | OUTPATIENT
Start: 2019-04-17 | End: 2019-04-20

## 2019-04-17 NOTE — DOWNTIME EVENT NOTE
The EMR was down for 2 hours on 4/17/2019. Emeterio Garvey RN was responsible for completing the paper charting during this time period.      The following information was re-entered into the system by Emeterio Garvey: none    The following information will re

## 2019-04-17 NOTE — PHYSICAL THERAPY NOTE
PHYSICAL THERAPY EVALUATION - INPATIENT     Room Number: 540/540-A  Evaluation Date: 4/17/2019  Type of Evaluation: Initial   Physician Order: PT Eval and Treat    Presenting Problem: p/w scrotal cellulitis and gangrene  Reason for Therapy: Mobility Dysfu safely with continued participation in acute PT. PT recommendation home with 24 hr supervision and HHPT at d/c. Patient will benefit from continued IP PT services to address these deficits in preparation for discharge.     DISCHARGE RECOMMENDATIONS  PT • SKIN SURGERY      basal cell carcinoma removal from nose and ear       HOME SITUATION  Type of Home: Condo(with elevator)   Home Layout: One level  Stairs to Enter : 0     Stairs to [employer]: 0     Lives With: Spouse  Drives: Yes  Patient Arnav Bledsoe need...   -   Moving to and from a bed to a chair (including a wheelchair)?: A Little   -   Need to walk in hospital room?: A Little   -   Climbing 3-5 steps with a railing?: A Little     AM-PAC Score:  Raw Score: 18   Approx Degree of Impairment: 46.58%

## 2019-04-17 NOTE — OCCUPATIONAL THERAPY NOTE
OCCUPATIONAL THERAPY EVALUATION - INPATIENT     Room Number: 540/540-A  Evaluation Date: 4/17/2019  Type of Evaluation: Initial  Presenting Problem: (scrotal cellulitis)    Physician Order: IP Consult to Occupational Therapy  Reason for Therapy: ADL/IADL D wife        OCCUPATIONAL THERAPY MEDICAL/SOCIAL HISTORY     Problem List  Principal Problem:    Cellulitis, scrotum  Active Problems:    Scrotal abscess    Tinea cruris    BPH with obstruction/lower urinary tract symptoms      Past Medical History  Past Me mobility.  Pt is retired as is his wife who will be available to assist at discharge    SUBJECTIVE  **    OCCUPATIONAL THERAPY EXAMINATION      OBJECTIVE  Precautions: (scrotal elevation)  Fall Risk: Standard fall risk    PAIN ASSESSMENT  Ratin  Locatio Comment:     Patient will complete toileting with I  Comment:      Comment:    Patient will complete item retrieval with mod I  Comment:          Goals  on: 19  Frequency: 2-3 more sessions

## 2019-04-17 NOTE — PROGRESS NOTES
[facility]    Progress Note    Tawana Mtz Patient Status:  Inpatient    1945 MRN [id number]   Location [facility] 5SW/SE Attending Sandy Toney MD   Hosp Day # 2 PCP King Contreras MD       Subjective:   Mariama Cotto furosemide  40 mg Oral BID   • lisinopril  2.5 mg Oral Daily   • Metoprolol Succinate ER  50 mg Oral Daily   • Pantoprazole Sodium  40 mg Oral Daily       Current PRN Inpatient Meds:      diphenhydrAMINE, diphenhydrAMINE-zinc acetate, Normal Saline Flush, Encounter on 04/15/19   1. URINE CULTURE, ROUTINE     Status: None (Preliminary result)    Collection Time: 04/15/19  4:46 PM   Result Value Ref Range    Urine Culture No Growth at 18-24 hrs.  N/A       Imaging/EKG:           Assessment and Plan:   Cellulit

## 2019-04-17 NOTE — PROGRESS NOTES
[facility] ID PROGRESS NOTE    Aj Ng Patient Status:  Inpatient    1945 MRN [id number]   Location [facility] 5SW/SE Attending Yonathan Barrera MD   Hosp Day # 2 PCP Tadeo Bazan MD     Subjective:  Tmax 99.1.  Having less pain back pain without sciatica     Weakness     Current moderate episode of major depressive disorder without prior episode (HCC)     Irregular Z line of esophagus     Gynecomastia, male     Leg cramps     Cellulitis, scrotum     Scrotal abscess     Tinea crur [address] [facility]  [phone]  4/16/2019

## 2019-04-17 NOTE — PROGRESS NOTES
RRT    *See RRT Documentation Record*    Reason the RRT was called: Patient with puncture to right lower leg. Patient states he was scratching but says he is unsure if he caused it. Assessment of patient leading up to RRT: Vitals checked.  Blood shooting o

## 2019-04-17 NOTE — PLAN OF CARE
Problem: Patient Centered Care  Goal: Patient preferences are identified and integrated in the patient's plan of care  Description  Interventions:  - What would you like us to know as we care for you?  I live at home with my wife  - Provide timely, comple Medicated with norco for pain     Problem: RISK FOR INFECTION - ADULT  Goal: Absence of fever/infection during anticipated neutropenic period  Description  INTERVENTIONS  - Monitor WBC  - Administer growth factors as ordered  - Implement neutropenic guid

## 2019-04-17 NOTE — CM/SW NOTE
MDO received for advanced directives. Per RN, PT also recommending HHC. SW provided HPOA - requesting to review it when wife arrives to hospital. SW discussed [address] - pt agreeable & has no preference. SW placed referral to [facility].     Willam Andrade, [address]

## 2019-04-17 NOTE — BH PROGRESS NOTE
Behavioral Health Note  CHIEF COMPLAINT  Scrotal cellulitis and abscess    REASON FOR ADMISSION  Scrotal cellulitis and abscess    SOCIAL HISTORY  Trae lives at home with his wife and is a retired . He does not smoke or use drugs.  He linear  Thought content: no abnormality  Perceptions:no hallucinations  Insight: limited, evidenced by limiting his drinking and considering reintroducing alcohol  Judgment: limited, evidenced by limiting his drinking and considering reintroducing alcohol

## 2019-04-17 NOTE — PROGRESS NOTES
[facility]    Progress Note    Benjarobinson Mccabe Patient Status:  Inpatient    1945 MRN [id number]   [facility] Attending Isidra Stover MD   Hosp Day # 2 PCP Lázaro Varela MD       Subjective:   Leanora Rash increased vascularity compatible with a diffuse scrotal cellulitis. No well defined intra scrotal fluid collection or abscess. Normal bilateral testes and epididymides without evidence of orchitis or epididymitis\".       He is currently on IV cefazolin a at 12:42          Us Scrotum W/ Doppler (cpt=93975/26354)    Result Date: 4/15/2019  CONCLUSION:  1. There is marked bilateral scrotal skin thickening with increased vascularity compatible with a diffuse scrotal cellulitis.   I can't exclude Emma's gang

## 2019-04-17 NOTE — PLAN OF CARE
Problem: Patient Centered Care  Goal: Patient preferences are identified and integrated in the patient's plan of care  Description  Interventions:  - What would you like us to know as we care for you?  I live at home with my wife  - Provide timely, comple fever/infection during anticipated neutropenic period  Description  INTERVENTIONS  - Monitor WBC  - Administer growth factors as ordered  - Implement neutropenic guidelines  Outcome: Progressing     Site cleansed with saline, antifungal powder applied, makayla

## 2019-04-18 PROCEDURE — 99233 SBSQ HOSP IP/OBS HIGH 50: CPT | Performed by: HOSPITALIST

## 2019-04-18 PROCEDURE — 99231 SBSQ HOSP IP/OBS SF/LOW 25: CPT | Performed by: NURSE PRACTITIONER

## 2019-04-18 RX ORDER — CEFADROXIL 1000 MG/1
1 TABLET ORAL 2 TIMES DAILY
Qty: 14 TABLET | Refills: 0 | Status: SHIPPED | OUTPATIENT
Start: 2019-04-18 | End: 2019-04-25

## 2019-04-18 RX ORDER — FLUCONAZOLE 200 MG/1
200 TABLET ORAL DAILY
Qty: 6 TABLET | Refills: 0 | Status: SHIPPED | OUTPATIENT
Start: 2019-04-18 | End: 2019-04-24

## 2019-04-18 RX ORDER — HYDROCODONE BITARTRATE AND ACETAMINOPHEN 7.5; 325 MG/1; MG/1
1 TABLET ORAL EVERY 6 HOURS PRN
Status: DISCONTINUED | OUTPATIENT
Start: 2019-04-18 | End: 2019-04-20

## 2019-04-18 NOTE — PLAN OF CARE
Problem: Patient Centered Care  Goal: Patient preferences are identified and integrated in the patient's plan of care  Description  Interventions:  - What would you like us to know as we care for you?  I live at home with my wife  - Provide timely, comple controlled with po Norco and IV morphine. Pt c/o increased pain with movement. Scrotum red and painful. Elevated on abd pads and pillowcase. Some purulent drainage noted, MD aware. Up to chair for lunch with stand by assist and walker.     Problem: RISK FOR

## 2019-04-18 NOTE — PROGRESS NOTES
[facility] ID PROGRESS NOTE    Woo Barbosa Patient Status:  Inpatient    1945 MRN [id number]   Location [facility] 5SW/SE Attending Maria Del Carmen Lomas MD   Hosp Day # 3 PCP Sophia Montero MD     Subjective:  Afebrile.  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 Will continue to monitor on IV antibiotics for another 24 hours and hopefully convert to PO tomorrow AM.  -  Local wound care and elevation.  -  Follow fever curve, wbc.   -  Reviewed labs, micro, imaging reports.  -  Case d/w pa

## 2019-04-18 NOTE — HOME CARE LIAISON
Received referral from [address] [place] [address]. Met with patient at the bedside. Patient is agreeable to [facility], pending orders. Brochure and liaison's card provided with contact information. All questions addressed and answered.      Patient will

## 2019-04-18 NOTE — PROGRESS NOTES
[facility]    Progress Note    Nurys Asher Patient Status:  Inpatient    1945 MRN [id number]   [facility] 5SW/SE Attending Jhoan Enriquez MD   Hosp Day # 3 PCP Ofelia Niño MD       Subjective:   Jarocho Burdick mg Oral Daily   • eplerenone  50 mg Oral Daily   • folic acid  1 mg Oral Daily   • furosemide  40 mg Oral BID   • lisinopril  2.5 mg Oral Daily   • Metoprolol Succinate ER  50 mg Oral Daily   • Pantoprazole Sodium  40 mg Oral Daily       Current PRN Inpati --    AST 26  --   --   --    ALT 33  --   --   --    BILT 1.8  --   --   --    TP 8.5*  --   --   --      Lab Results   Component Value Date    INR 1.2 09/14/2018    INR 1.2 08/13/2018    INR 1.2 07/13/2018       Culture:  Hospital Encounter on 04/15/19

## 2019-04-18 NOTE — PROGRESS NOTES
[facility]    Progress Note    Lisa Marshall Patient Status:  Inpatient    1945 MRN [id number]   [facility] 5SW/SE Attending Mirna Curtis MD   Hosp Day # 3 PCP Ilene Pratt MD       Subjective:   Collins Krueger scrotal cellulitis.  No well defined intra scrotal fluid collection or abscess.  Normal bilateral testes and epididymides without evidence of orchitis or epididymitis\".       He is currently on IV cefazolin and PO diflucan per ID service.   His scrotal swe

## 2019-04-19 PROCEDURE — 99231 SBSQ HOSP IP/OBS SF/LOW 25: CPT | Performed by: NURSE PRACTITIONER

## 2019-04-19 PROCEDURE — 99233 SBSQ HOSP IP/OBS HIGH 50: CPT | Performed by: HOSPITALIST

## 2019-04-19 NOTE — PROGRESS NOTES
[facility] ID PROGRESS NOTE    Subha St Patient Status:  Inpatient    1945 MRN [id number]   Location [facility] 5SW/SE Attending Kd Feldman MD   Hosp Day # 4 PCP Mariia Hilton MD     Subjective:  Afebrile.  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 Scripts in chart.  -  Local wound care and elevation.  -  Follow fever curve, wbc. -  Reviewed labs, micro, imaging reports.  -  Case d/w patient, RN.     Solomon Simpson Infect[facility] Kyleigh Gunderson

## 2019-04-19 NOTE — PLAN OF CARE
Problem: Patient Centered Care  Goal: Patient preferences are identified and integrated in the patient's plan of care  Description  Interventions:  - What would you like us to know as we care for you?  I live at home with my wife  - Provide timely, comple fever/infection during anticipated neutropenic period  Description  INTERVENTIONS  - Monitor WBC  - Administer growth factors as ordered  - Implement neutropenic guidelines  Outcome: Progressing     Problem: SAFETY ADULT - FALL  Goal: Free from fall injury

## 2019-04-19 NOTE — PROGRESS NOTES
[facility]    Progress Note    Fredy Harms Patient Status:  Inpatient    1945 MRN [id number]   Location [facility] 5SW/SE Attending Shayan Doss MD   Hosp Day # 4 PCP Tanisha Malone MD       Subjective:   Silke Red is currently on IV cefazolin and PO diflucan per ID service.  His scrotal swelling and erythema appears to be improving. No drainage at this time. He states his pain is improving. Afebrile. WBC stable.   No problems with urination      #Cellulitis, scro

## 2019-04-20 VITALS
TEMPERATURE: 98 F | DIASTOLIC BLOOD PRESSURE: 67 MMHG | HEIGHT: 71 IN | WEIGHT: 186.06 LBS | BODY MASS INDEX: 26.05 KG/M2 | OXYGEN SATURATION: 97 % | RESPIRATION RATE: 18 BRPM | HEART RATE: 96 BPM | SYSTOLIC BLOOD PRESSURE: 109 MMHG

## 2019-04-20 PROCEDURE — 99239 HOSP IP/OBS DSCHRG MGMT >30: CPT | Performed by: HOSPITALIST

## 2019-04-20 RX ORDER — HYDROCODONE BITARTRATE AND ACETAMINOPHEN 7.5; 325 MG/1; MG/1
1 TABLET ORAL EVERY 6 HOURS PRN
Qty: 10 TABLET | Refills: 0 | Status: SHIPPED | OUTPATIENT
Start: 2019-04-20 | End: 2019-05-20

## 2019-04-20 RX ORDER — DIPHENHYDRAMINE HYDROCHLORIDE, ZINC ACETATE 2; .1 G/100G; G/100G
1 CREAM TOPICAL 3 TIMES DAILY PRN
Qty: 1 TUBE | Refills: 0 | Status: SHIPPED | OUTPATIENT
Start: 2019-04-20 | End: 2019-05-20

## 2019-04-20 NOTE — PLAN OF CARE
Problem: Patient Centered Care  Goal: Patient preferences are identified and integrated in the patient's plan of care  Description  Interventions:  - What would you like us to know as we care for you?  I live at home with my wife  - Provide timely, comple INFECTION - ADULT  Goal: Absence of fever/infection during anticipated neutropenic period  Description  INTERVENTIONS  - Monitor WBC  - Administer growth factors as ordered  - Implement neutropenic guidelines  Outcome: Adequate for Discharge     Problem: S

## 2019-04-20 NOTE — PROGRESS NOTES
[facility]    Progress Note    Nazanin Goldstein Patient Status:  Inpatient    1945 MRN [id number]   Location [facility] 5SW/SE Attending Jackson Weiss MD   Hosp Day # 4 PCP Katrin Siddiqui MD       Subjective:     Nina Medina BB, asa     HL- statin     Gout - no acute issues     ALL, h/o -stable     OA     Depression     dvt proph:   heparin    Code status:   Full    >35 minutes spent     Jewel Claire MD  4/19/2019

## 2019-04-20 NOTE — CM/SW NOTE
[address] orders entered. RN informed SW that pt is medically stable to discharge today. SW notified [facility] from [facility].      Leoncio Ness

## 2019-04-21 NOTE — DISCHARGE SUMMARY
[facility]    Discharge Summary    Jessica Vincent Patient Status:  Inpatient    1945 MRN [id number]   Location [facility] 5SW/SE Attending No att. providers found   [facility] Day # 5 PCP Jami Chance MD     Date of Adm SpO2 97%   BMI 25.95 kg/m²     Gen:   NAD.   A and O x 3  CV:   RRR, no m/g/r  Pulm:   CTA bilat  Abd:   +bs, soft, NT, ND  LE:   No c/c/e  Scrotum about size of baseball with erythema and swelling  Neuro:   nonfocal      Reason for Admission:       Scrota Full    Consultations:   ID, urology    Discharge Condition:  Good    Discharge Medications:      Discharge Medications      START taking these medications      Instructions Prescription details   Cefadroxil 1 g Tabs      Take 1 g by mouth 2 (two) times da once daily. Quantity:  90 tablet  Refills:  1     Vitamin B-1 100 MG Tabs  Commonly known as:  VITAMIN B1      Take 100 mg by mouth daily.    Refills:  0        STOP taking these medications    eplerenone 50 MG Tabs  Commonly known as:  INSPRA        furo

## 2019-04-22 ENCOUNTER — OFFICE VISIT (OUTPATIENT)
Dept: SURGERY | Facility: CLINIC | Age: 74
End: 2019-04-22
Payer: MEDICARE

## 2019-04-22 ENCOUNTER — TELEPHONE (OUTPATIENT)
Dept: INTERNAL MEDICINE CLINIC | Facility: CLINIC | Age: 74
End: 2019-04-22

## 2019-04-22 ENCOUNTER — PATIENT OUTREACH (OUTPATIENT)
Dept: CASE MANAGEMENT | Age: 74
End: 2019-04-22

## 2019-04-22 VITALS
SYSTOLIC BLOOD PRESSURE: 109 MMHG | BODY MASS INDEX: 24 KG/M2 | WEIGHT: 175 LBS | RESPIRATION RATE: 16 BRPM | OXYGEN SATURATION: 97 % | DIASTOLIC BLOOD PRESSURE: 68 MMHG | HEART RATE: 70 BPM

## 2019-04-22 DIAGNOSIS — K70.30 ALCOHOLIC CIRRHOSIS OF LIVER WITHOUT ASCITES (HCC): ICD-10-CM

## 2019-04-22 DIAGNOSIS — G93.40 ENCEPHALOPATHY: Primary | ICD-10-CM

## 2019-04-22 RX ORDER — LACTULOSE 10 G/15ML
20 SOLUTION ORAL 2 TIMES DAILY
Qty: 1800 ML | Refills: 3 | Status: SHIPPED | OUTPATIENT
Start: 2019-04-22 | End: 2019-05-20

## 2019-04-22 NOTE — PROGRESS NOTES
[facility]  [address], [address]  [address]  [phone] • Other and unspecified hyperlipidemia     drug therapy   • Perforated ear drum    • Tubular adenoma of colon 5/2016    Repeat in 5/2019   • Unspecified essential hypertension     drug therapy      Past Surgical History:   Procedure Laterality Date   • C mouth daily for 6 days. , Disp: 6 tablet, Rfl: 0  •  atorvastatin 80 MG Oral Tab, Take 1 tablet (80 mg total) by mouth once daily. , Disp: 90 tablet, Rfl: 3  •  Pantoprazole Sodium 40 MG Oral Tab EC, Take 1 tablet (40 mg total) by mouth once daily. , Disp: 90 sense to consider hospice at that time   - Immune to hepatitis A, susceptible to hepatitis B  - Follow up ~ 2 months    Jamey Hills MD  Director of Hepatology  Medical Director of [address] [place] [address] of [address] [place] [address]

## 2019-04-22 NOTE — TELEPHONE ENCOUNTER
[facility] started today.

## 2019-04-22 NOTE — PROGRESS NOTES
Sherwin Roy [phone] for post hospital follow up, [facility] contact information provided.

## 2019-04-23 ENCOUNTER — PATIENT MESSAGE (OUTPATIENT)
Dept: CASE MANAGEMENT | Age: 74
End: 2019-04-23

## 2019-04-26 ENCOUNTER — TELEPHONE (OUTPATIENT)
Dept: INTERNAL MEDICINE CLINIC | Facility: CLINIC | Age: 74
End: 2019-04-26

## 2019-04-26 NOTE — TELEPHONE ENCOUNTER
Drew Physical Therapist from [facility] called she stated Pt is requesting to be discharged from [facility] as of today and if she can get call back that PCP got her message    She stated can call anytime

## 2019-05-01 NOTE — PROGRESS NOTES
[facility] [phone] for post hospital follow up, [facility] contact information provided.

## 2019-05-03 NOTE — PLAN OF CARE
When bleeding subsided, assessed wound site and noted that there was no depth to the wound and had a small pinpoint scratch on lower leg. Also assessed area around the patient and nothing sharp was noted.

## 2019-05-06 ENCOUNTER — OFFICE VISIT (OUTPATIENT)
Dept: SURGERY | Facility: CLINIC | Age: 74
End: 2019-05-06
Payer: MEDICARE

## 2019-05-06 VITALS
SYSTOLIC BLOOD PRESSURE: 130 MMHG | BODY MASS INDEX: 24.5 KG/M2 | DIASTOLIC BLOOD PRESSURE: 85 MMHG | HEART RATE: 66 BPM | RESPIRATION RATE: 17 BRPM | WEIGHT: 175 LBS | HEIGHT: 71 IN

## 2019-05-06 DIAGNOSIS — R21 GROIN RASH: ICD-10-CM

## 2019-05-06 DIAGNOSIS — R35.0 BENIGN PROSTATIC HYPERPLASIA WITH URINARY FREQUENCY: ICD-10-CM

## 2019-05-06 DIAGNOSIS — N40.1 BENIGN PROSTATIC HYPERPLASIA WITH URINARY FREQUENCY: ICD-10-CM

## 2019-05-06 DIAGNOSIS — B35.6 TINEA CRURIS: Primary | ICD-10-CM

## 2019-05-06 DIAGNOSIS — R97.20 ELEVATED PSA: ICD-10-CM

## 2019-05-06 DIAGNOSIS — R35.1 NOCTURIA: ICD-10-CM

## 2019-05-06 PROCEDURE — 99214 OFFICE O/P EST MOD 30 MIN: CPT | Performed by: UROLOGY

## 2019-05-06 PROCEDURE — G0463 HOSPITAL OUTPT CLINIC VISIT: HCPCS | Performed by: UROLOGY

## 2019-05-06 NOTE — PATIENT INSTRUCTIONS
Darcie Chirinos M.D.      1.   To prevent another fungal infection + bacterial infection of the scrotal skin, please wash area daily with unscented Dove soap--light rather than heavy leather and dry gently afterwards.     2.  T

## 2019-05-06 NOTE — PROGRESS NOTES
HPI:    Patient ID: Joe Rodriguez is a 75year old male. HPI   Groin Rash--RESOLVED  Problem started 4/6/2019. Patient complained of a painful and very erythematous rash located on his right groin and scrotum.  Patient noted associated sharp, burning postponing urination, weak stream, and nocturia 2x. The patient denies sensation of not emptying bladder, intermittent stream, or urinary hesitancy as well as associated gross hematuria or dysuria. Patient is not currently taking any  medication.  The pa believe it is more likely a fungal infection of the scrotal skin and right inguinal fold, I am not completely certain of that; if no improvement in his condition after several days on Diflucan, to go to the emergency room--patient and wife understand. \"  4 MG Oral Tab EC Take 1 tablet (40 mg total) by mouth once daily. Disp: 90 tablet Rfl: 1   folic acid 1 MG Oral Tab Take 1 tablet (1 mg total) by mouth daily.  Disp: 90 tablet Rfl: 1   Metoprolol Succinate ER 50 MG Oral Tablet 24 Hr Take 1 tablet (50 mg total nose and ear      Family History   Problem Relation Age of Onset   • Cancer Father [de-identified]        prostate (cause of death)   • Diabetes Father    • Heart Disorder Mother         cva   • Stroke Mother 67        cause of death   • Cancer Mother 76        cancer respiratory distress. Genitourinary:   Genitourinary Comments: Scrotum enlarged, third spacing of the scrotum; due to fluid accumulation, no longer erythematous   Musculoskeletal: Normal range of motion.    Neurological: He is alert and oriented to person cannot exclude Emma's gangrene. Correlate clinically. No well-defined intrascrotal skin fluid collection or abscess. Scrotal skin measures up to 6.6 mm in thickness. CONCLUSION:  1.  There is marked bilateral scrotal skin thickening with increased va or complex hydroceles versus scrotal abscesses. No intrascrotal or soft tissue gas. Extensive scrotal soft tissue swelling suggestive of cellulitis. Retroperitoneal lymphadenopathy is unchanged since 7/11/2018.         ASSESSMENT/PLAN:     (B35.6) Tinea 6/12/2018.     (N40.1,  R35.0) Benign prostatic hyperplasia with urinary frequency  On 6/12/2018 SILVIA, prostate 2-3+ enlarged, no palpable nodules or indurations.  He feels this is stable and decides to observe.     (R35.1) Nocturia  Patient has current AUA complete.   Colan Homans, MD, 5/6/2019, 7:59 PM

## 2019-05-07 ENCOUNTER — TELEPHONE (OUTPATIENT)
Dept: INTERNAL MEDICINE CLINIC | Facility: CLINIC | Age: 74
End: 2019-05-07

## 2019-05-07 NOTE — TELEPHONE ENCOUNTER
Pt states he was in the hospital and was discharged on 04/22/19 states he is feeling fine. Offered pt an apt he declined.

## 2019-05-10 RX ORDER — PANTOPRAZOLE SODIUM 40 MG/1
TABLET, DELAYED RELEASE ORAL
Qty: 90 TABLET | Refills: 1 | Status: SHIPPED | OUTPATIENT
Start: 2019-05-10 | End: 2019-10-24

## 2019-05-15 ENCOUNTER — LAB ENCOUNTER (OUTPATIENT)
Dept: LAB | Age: 74
End: 2019-05-15
Attending: INTERNAL MEDICINE
Payer: MEDICARE

## 2019-05-15 DIAGNOSIS — I50.30 DIASTOLIC CONGESTIVE HEART FAILURE, UNSPECIFIED HF CHRONICITY (HCC): ICD-10-CM

## 2019-05-15 DIAGNOSIS — R60.9 EDEMA, UNSPECIFIED TYPE: ICD-10-CM

## 2019-05-15 DIAGNOSIS — K70.30 ALCOHOLIC CIRRHOSIS OF LIVER WITHOUT ASCITES (HCC): ICD-10-CM

## 2019-05-15 PROCEDURE — 85025 COMPLETE CBC W/AUTO DIFF WBC: CPT

## 2019-05-15 PROCEDURE — 80053 COMPREHEN METABOLIC PANEL: CPT

## 2019-05-15 PROCEDURE — 85610 PROTHROMBIN TIME: CPT

## 2019-05-15 PROCEDURE — 36415 COLL VENOUS BLD VENIPUNCTURE: CPT

## 2019-06-16 DIAGNOSIS — I10 ESSENTIAL HYPERTENSION WITH GOAL BLOOD PRESSURE LESS THAN 140/90: ICD-10-CM

## 2019-06-16 DIAGNOSIS — I10 ESSENTIAL HYPERTENSION: ICD-10-CM

## 2019-06-16 DIAGNOSIS — I25.10 CORONARY ARTERY DISEASE INVOLVING NATIVE CORONARY ARTERY OF NATIVE HEART WITHOUT ANGINA PECTORIS: Chronic | ICD-10-CM

## 2019-06-17 RX ORDER — METOPROLOL SUCCINATE 50 MG/1
TABLET, EXTENDED RELEASE ORAL
Qty: 90 TABLET | Refills: 1 | Status: SHIPPED | OUTPATIENT
Start: 2019-06-17 | End: 2019-06-24

## 2019-06-17 NOTE — TELEPHONE ENCOUNTER
Refill passed per [facility] protocol.   Hypertensive Medications  Protocol Criteria:  · Appointment scheduled in the past 6 months or in the next 3 months  · BMP or CMP in the past 12 months  · Creatinine result < 2  Recent Outpatient Visits

## 2019-06-19 ENCOUNTER — TELEPHONE (OUTPATIENT)
Dept: GASTROENTEROLOGY | Facility: CLINIC | Age: 74
End: 2019-06-19

## 2019-06-24 NOTE — PROGRESS NOTES
[facility]  [address], [address]  [address]  [phone] ([phone]) Obesity, unspecified    • Other and unspecified hyperlipidemia     drug therapy   • Perforated ear drum    • Tubular adenoma of colon 5/2016    Repeat in 5/2019   • Unspecified essential hypertension     drug therapy      Past Surgical History:   Procedure tablet, Rfl: 1  •  aspirin 325 MG Oral Tab, Take 325 mg by mouth daily. , Disp: , Rfl:   •  Vitamin B-1 (VITAMIN B1) 100 MG Oral Tab, Take 100 mg by mouth daily. , Disp: , Rfl:     Exam:  Vitals per chart  Gen: NAD  HEENT: Anicteric  Lymph: no cervical LAD

## 2019-06-27 NOTE — TELEPHONE ENCOUNTER
GI/RN--    This TE was sent to me in error.     Please request orders from Dr. Laquita Jorgensen, thank you

## 2019-06-27 NOTE — TELEPHONE ENCOUNTER
Last Procedure, Date, MD:  NAMRATA 5/11/16 with Dr. Hilton Postal  Last Diagnosis:  See below  Recalled for (months or years): 3 yrs-due 5/11/19  Sedation used previously:  MAC  Last Prep Used (if known):  split dose colyte  Quality of prep (if known): \"good\"  Anticoa

## 2019-06-27 NOTE — TELEPHONE ENCOUNTER
Operative Report        Date/Time of Note  Note Date and Time  5/11/16  09:24    *[facility]-Operative Note  Operative Note  Admit Date    Surgeon  Jaylin Steele MD  Anesthesia Type:  MAC  Pre-Op Diagnosis:    (1) Screen for colon cancer  Post-Op Diagnosis: ascending colon; flat morphology, cold   biopsy   polypectomy removal and retrieved. C. 4 mm polyp in the ascending colon; flat morphology,cold   biopsy   polypectomy removal and retrieved.       D. 5 mm polyp in the ascending colon; polypoid morpholog [facility]                                             DEPARTMENT OF PATHOLOGY AND LABORATORY MEDICINE                               [address] [place] [address]  [phone]                                           SURGICA Signed:   Chelsie Garcia MD                05/12/16 (Electronic)   --------------------------------------------------------------------------------                                     Natacha Sears M.D., [address] [place] [address] LICENSE#: [id number]

## 2019-06-28 NOTE — TELEPHONE ENCOUNTER
Spoke to pt and wife over the phone and reviewed Dr. Tanner Willoughby message below. I offered multiple July dates for OV and they declined b/c they would be in [place].  Per their request, they accepted the following appt[facility] and told to

## 2019-06-28 NOTE — TELEPHONE ENCOUNTER
With his multiple medical problems, I am not sure it makes sense to go for colonoscopy at this time. Hx of cirrhosis etc. Please have patient make non urgent appt in clinic to see me to discuss.

## 2019-07-10 ENCOUNTER — LAB ENCOUNTER (OUTPATIENT)
Dept: LAB | Age: 74
End: 2019-07-10
Attending: NURSE PRACTITIONER
Payer: MEDICARE

## 2019-07-10 DIAGNOSIS — R25.2 LEG CRAMPING: ICD-10-CM

## 2019-07-10 LAB
ANION GAP SERPL CALC-SCNC: 7 MMOL/L (ref 0–18)
BUN BLD-MCNC: 21 MG/DL (ref 7–18)
BUN/CREAT SERPL: 18.3 (ref 10–20)
CALCIUM BLD-MCNC: 8.7 MG/DL (ref 8.5–10.1)
CHLORIDE SERPL-SCNC: 102 MMOL/L (ref 98–112)
CO2 SERPL-SCNC: 28 MMOL/L (ref 21–32)
CREAT BLD-MCNC: 1.15 MG/DL (ref 0.7–1.3)
GLUCOSE BLD-MCNC: 94 MG/DL (ref 70–99)
HAV IGM SER QL: 2.2 MG/DL (ref 1.6–2.6)
OSMOLALITY SERPL CALC.SUM OF ELEC: 287 MOSM/KG (ref 275–295)
PATIENT FASTING: NO
POTASSIUM SERPL-SCNC: 3.7 MMOL/L (ref 3.5–5.1)
SODIUM SERPL-SCNC: 137 MMOL/L (ref 136–145)

## 2019-07-10 PROCEDURE — 80048 BASIC METABOLIC PNL TOTAL CA: CPT

## 2019-07-10 PROCEDURE — 36415 COLL VENOUS BLD VENIPUNCTURE: CPT

## 2019-07-10 PROCEDURE — 83735 ASSAY OF MAGNESIUM: CPT

## 2019-08-08 DIAGNOSIS — I10 ESSENTIAL HYPERTENSION WITH GOAL BLOOD PRESSURE LESS THAN 140/90: ICD-10-CM

## 2019-08-08 DIAGNOSIS — I10 ESSENTIAL HYPERTENSION: ICD-10-CM

## 2019-08-08 DIAGNOSIS — I25.10 CORONARY ARTERY DISEASE INVOLVING NATIVE CORONARY ARTERY OF NATIVE HEART WITHOUT ANGINA PECTORIS: Chronic | ICD-10-CM

## 2019-08-08 RX ORDER — PANTOPRAZOLE SODIUM 40 MG/1
40 TABLET, DELAYED RELEASE ORAL
Qty: 90 TABLET | Refills: 1 | OUTPATIENT
Start: 2019-08-08

## 2019-08-09 RX ORDER — METOPROLOL SUCCINATE 50 MG/1
25 TABLET, EXTENDED RELEASE ORAL NIGHTLY
Qty: 90 TABLET | Refills: 1 | Status: SHIPPED | OUTPATIENT
Start: 2019-08-09 | End: 2020-01-27

## 2019-08-09 NOTE — TELEPHONE ENCOUNTER
Please advise on refill request for Metoprolol ER 50mg, per record pt was previously on 1 tab daily, on 6/24/19 had appt with Cardiology, an updated script (historical) has been added by [address] [place], [place] [id number] noting patient to now be taking 1/2 tab (25mg nightly).

## 2019-08-12 ENCOUNTER — OFFICE VISIT (OUTPATIENT)
Dept: GASTROENTEROLOGY | Facility: CLINIC | Age: 74
End: 2019-08-12
Payer: MEDICARE

## 2019-08-12 VITALS
HEART RATE: 55 BPM | WEIGHT: 187.38 LBS | DIASTOLIC BLOOD PRESSURE: 75 MMHG | BODY MASS INDEX: 26.23 KG/M2 | SYSTOLIC BLOOD PRESSURE: 117 MMHG | HEIGHT: 71 IN

## 2019-08-12 DIAGNOSIS — K70.30 ALCOHOLIC CIRRHOSIS OF LIVER WITHOUT ASCITES (HCC): ICD-10-CM

## 2019-08-12 DIAGNOSIS — M79.89 LEG SWELLING: Primary | ICD-10-CM

## 2019-08-12 PROCEDURE — 99213 OFFICE O/P EST LOW 20 MIN: CPT | Performed by: INTERNAL MEDICINE

## 2019-08-12 PROCEDURE — G0463 HOSPITAL OUTPT CLINIC VISIT: HCPCS | Performed by: INTERNAL MEDICINE

## 2019-08-12 NOTE — PROGRESS NOTES
[address] [facility] Follow-up Visit    Iliana of colon 5/2016    Repeat in 5/2019   • Unspecified essential hypertension     drug therapy      Past Surgical History:   Procedure Laterality Date   • CABG  07/2011   • COLONOSCOPY     • ESOPHAGOGASTRODUODENOSCOPY (EGD) N/A 10/17/2018    Performed by Urmila Dowell Rfl:    Vitamin B-1 (VITAMIN B1) 100 MG Oral Tab Take 100 mg by mouth daily.  Disp:  Rfl:        Allergies:    Penicillins                 Comment:Other reaction(s): Unknown  Penicillin G            RASH    Comment:Per Dr. Pam Hung MD, ok to give Zos 2016 showed 6 polyps removed. Some of the polyps were tubular adenomas.   Discussed whether we should repeat colonoscopy, I did offer repeat colonoscopy and after discussion of the risks and benefits of with this approach, he opted to not proceed with colo

## 2019-08-29 ENCOUNTER — TELEPHONE (OUTPATIENT)
Dept: AUDIOLOGY | Facility: CLINIC | Age: 74
End: 2019-08-29

## 2019-08-29 NOTE — TELEPHONE ENCOUNTER
Pt would like to drop off hearing aids for repair, would like loaner hearing aid for R ear. Pls call thank you.

## 2019-08-29 NOTE — TELEPHONE ENCOUNTER
Spoke with patient. Will get loaner ready for use with dome for right ear. Quoted $300 for repair of whole unit with one year warranty. If BTE is working and we only need a new c-shell, quoted $125 for new c-shell with 90day warranty.    Patient pref

## 2019-08-30 NOTE — TELEPHONE ENCOUNTER
Patient dropped off right aid and picked up loaner. Aid is working but wire to c-shell is broken. Ordered new right c-shell via Josefa Goldstein ordering. Will call patient for   when c-shell comes in .    Charges of $125 will be entered

## 2019-09-17 PROCEDURE — V5299 HEARING SERVICE: HCPCS | Performed by: AUDIOLOGIST

## 2019-09-18 ENCOUNTER — OFFICE VISIT (OUTPATIENT)
Dept: AUDIOLOGY | Facility: CLINIC | Age: 74
End: 2019-09-18

## 2019-09-18 DIAGNOSIS — H90.3 SENSORINEURAL HEARING LOSS, BILATERAL: Primary | ICD-10-CM

## 2019-09-18 PROCEDURE — V5267 HEARING AID SUP/ACCESS/DEV: HCPCS | Performed by: AUDIOLOGIST

## 2019-09-18 PROCEDURE — 92593 HEARING AID CHECK, BOTH EARS: CPT | Performed by: AUDIOLOGIST

## 2019-10-09 PROBLEM — I87.2 VENOUS INSUFFICIENCY: Status: ACTIVE | Noted: 2019-10-09

## 2019-10-09 PROBLEM — R60.1 GENERALIZED EDEMA: Status: ACTIVE | Noted: 2019-10-09

## 2019-10-10 ENCOUNTER — LAB ENCOUNTER (OUTPATIENT)
Dept: LAB | Facility: HOSPITAL | Age: 74
End: 2019-10-10
Attending: INTERNAL MEDICINE
Payer: MEDICARE

## 2019-10-10 DIAGNOSIS — I87.2 VENOUS INSUFFICIENCY: ICD-10-CM

## 2019-10-10 DIAGNOSIS — R60.1 GENERALIZED EDEMA: ICD-10-CM

## 2019-10-10 PROCEDURE — 81003 URINALYSIS AUTO W/O SCOPE: CPT

## 2019-10-18 ENCOUNTER — OFFICE VISIT (OUTPATIENT)
Dept: INTERNAL MEDICINE CLINIC | Facility: CLINIC | Age: 74
End: 2019-10-18
Payer: MEDICARE

## 2019-10-18 ENCOUNTER — APPOINTMENT (OUTPATIENT)
Dept: LAB | Age: 74
End: 2019-10-18
Attending: PHYSICIAN ASSISTANT
Payer: MEDICARE

## 2019-10-18 VITALS
SYSTOLIC BLOOD PRESSURE: 109 MMHG | WEIGHT: 194.81 LBS | HEIGHT: 71 IN | DIASTOLIC BLOOD PRESSURE: 67 MMHG | BODY MASS INDEX: 27.27 KG/M2 | HEART RATE: 56 BPM

## 2019-10-18 DIAGNOSIS — M79.89 LEG SWELLING: ICD-10-CM

## 2019-10-18 DIAGNOSIS — L97.919 ULCERS OF BOTH LOWER LEGS (HCC): Primary | ICD-10-CM

## 2019-10-18 DIAGNOSIS — L97.929 ULCERS OF BOTH LOWER LEGS (HCC): Primary | ICD-10-CM

## 2019-10-18 PROCEDURE — 99213 OFFICE O/P EST LOW 20 MIN: CPT | Performed by: PHYSICIAN ASSISTANT

## 2019-10-18 PROCEDURE — 36415 COLL VENOUS BLD VENIPUNCTURE: CPT

## 2019-10-18 PROCEDURE — 80048 BASIC METABOLIC PNL TOTAL CA: CPT

## 2019-10-18 PROCEDURE — G0463 HOSPITAL OUTPT CLINIC VISIT: HCPCS | Performed by: PHYSICIAN ASSISTANT

## 2019-10-18 NOTE — PROGRESS NOTES
HPI:    Patient ID: Donald Butler is a 75year old male. HPI    Patient with PMH of cirrhotic liver, CHF, HTN, CABG, chronic leg swelling present with ulcerations on BL legs.  Per wife he has also gained a lot of weight recently, mostly fluid and has Allergies:  Penicillins                 Comment:Other reaction(s): Unknown  Penicillin G            RASH    Comment:Per Dr. Antonio Lee MD, ok to give Zosyn. Patient had some kind of reaction while he was a             kid.  Specific a Mother 67        cause of death   • Cancer Mother 76        cancer involving the bone   • Heart Disorder Sister         CAD (cause of death)   • Clotting Disorder Sister         history of DVT   • Genito-Urinary Disorder Neg         Urolithiasis         PH swelling does not improve on therapy. - WOUND - EXTERNAL    2.  Leg swelling  -lasix 20mg BID for 5 days, advised to follow up with HF clinic on further management of diuretic.  -strongly suggested compression stockings, discussed this with pt in detail

## 2019-10-22 ENCOUNTER — HOSPITAL ENCOUNTER (OUTPATIENT)
Facility: HOSPITAL | Age: 74
Setting detail: OBSERVATION
LOS: 1 days | Discharge: HOME | End: 2019-10-25
Attending: EMERGENCY MEDICINE | Admitting: HOSPITALIST
Payer: MEDICARE

## 2019-10-22 ENCOUNTER — TELEPHONE (OUTPATIENT)
Dept: INTERNAL MEDICINE CLINIC | Facility: CLINIC | Age: 74
End: 2019-10-22

## 2019-10-22 ENCOUNTER — APPOINTMENT (OUTPATIENT)
Dept: ULTRASOUND IMAGING | Facility: HOSPITAL | Age: 74
End: 2019-10-22
Attending: EMERGENCY MEDICINE
Payer: MEDICARE

## 2019-10-22 DIAGNOSIS — L03.115 CELLULITIS OF RIGHT LEG: Primary | ICD-10-CM

## 2019-10-22 PROCEDURE — 99223 1ST HOSP IP/OBS HIGH 75: CPT | Performed by: HOSPITALIST

## 2019-10-22 PROCEDURE — 93971 EXTREMITY STUDY: CPT | Performed by: EMERGENCY MEDICINE

## 2019-10-22 RX ORDER — CLINDAMYCIN PHOSPHATE 600 MG/50ML
600 INJECTION INTRAVENOUS ONCE
Status: COMPLETED | OUTPATIENT
Start: 2019-10-22 | End: 2019-10-22

## 2019-10-22 RX ORDER — HYDROCODONE BITARTRATE AND ACETAMINOPHEN 5; 325 MG/1; MG/1
1 TABLET ORAL EVERY 4 HOURS PRN
Status: DISCONTINUED | OUTPATIENT
Start: 2019-10-22 | End: 2019-10-25

## 2019-10-22 RX ORDER — PANTOPRAZOLE SODIUM 40 MG/1
40 TABLET, DELAYED RELEASE ORAL
Status: DISCONTINUED | OUTPATIENT
Start: 2019-10-23 | End: 2019-10-25

## 2019-10-22 RX ORDER — CEFAZOLIN SODIUM/WATER 2 G/20 ML
2 SYRINGE (ML) INTRAVENOUS ONCE
Status: DISCONTINUED | OUTPATIENT
Start: 2019-10-22 | End: 2019-10-22

## 2019-10-22 RX ORDER — METOPROLOL SUCCINATE 25 MG/1
25 TABLET, EXTENDED RELEASE ORAL NIGHTLY
Status: DISCONTINUED | OUTPATIENT
Start: 2019-10-22 | End: 2019-10-25

## 2019-10-22 RX ORDER — TEMAZEPAM 7.5 MG/1
15 CAPSULE ORAL NIGHTLY PRN
Status: DISCONTINUED | OUTPATIENT
Start: 2019-10-22 | End: 2019-10-25

## 2019-10-22 RX ORDER — FOLIC ACID 1 MG/1
1 TABLET ORAL DAILY
Status: DISCONTINUED | OUTPATIENT
Start: 2019-10-22 | End: 2019-10-25

## 2019-10-22 RX ORDER — ONDANSETRON 2 MG/ML
4 INJECTION INTRAMUSCULAR; INTRAVENOUS EVERY 6 HOURS PRN
Status: DISCONTINUED | OUTPATIENT
Start: 2019-10-22 | End: 2019-10-25

## 2019-10-22 RX ORDER — EPLERENONE 25 MG/1
50 TABLET, FILM COATED ORAL DAILY
Status: DISCONTINUED | OUTPATIENT
Start: 2019-10-22 | End: 2019-10-25

## 2019-10-22 RX ORDER — SODIUM CHLORIDE 0.9 % (FLUSH) 0.9 %
3 SYRINGE (ML) INJECTION AS NEEDED
Status: DISCONTINUED | OUTPATIENT
Start: 2019-10-22 | End: 2019-10-25

## 2019-10-22 RX ORDER — HYDROCODONE BITARTRATE AND ACETAMINOPHEN 5; 325 MG/1; MG/1
2 TABLET ORAL EVERY 4 HOURS PRN
Status: DISCONTINUED | OUTPATIENT
Start: 2019-10-22 | End: 2019-10-25

## 2019-10-22 RX ORDER — MULTIVITAMIN/IRON/FOLIC ACID 18MG-0.4MG
250 TABLET ORAL DAILY
Status: DISCONTINUED | OUTPATIENT
Start: 2019-10-22 | End: 2019-10-25

## 2019-10-22 RX ORDER — FUROSEMIDE 20 MG/1
20 TABLET ORAL 2 TIMES DAILY
Status: DISCONTINUED | OUTPATIENT
Start: 2019-10-22 | End: 2019-10-25

## 2019-10-22 RX ORDER — CLINDAMYCIN PHOSPHATE 600 MG/50ML
600 INJECTION INTRAVENOUS EVERY 8 HOURS
Status: DISCONTINUED | OUTPATIENT
Start: 2019-10-22 | End: 2019-10-23

## 2019-10-22 RX ORDER — ACETAMINOPHEN 325 MG/1
650 TABLET ORAL EVERY 4 HOURS PRN
Status: DISCONTINUED | OUTPATIENT
Start: 2019-10-22 | End: 2019-10-25

## 2019-10-22 RX ORDER — ASPIRIN 325 MG
325 TABLET ORAL DAILY
Status: DISCONTINUED | OUTPATIENT
Start: 2019-10-22 | End: 2019-10-25

## 2019-10-22 RX ORDER — ATORVASTATIN CALCIUM 40 MG/1
40 TABLET, FILM COATED ORAL
Status: DISCONTINUED | OUTPATIENT
Start: 2019-10-22 | End: 2019-10-25

## 2019-10-22 RX ORDER — HEPARIN SODIUM 5000 [USP'U]/ML
5000 INJECTION, SOLUTION INTRAVENOUS; SUBCUTANEOUS EVERY 12 HOURS SCHEDULED
Status: DISCONTINUED | OUTPATIENT
Start: 2019-10-22 | End: 2019-10-25

## 2019-10-22 RX ORDER — FUROSEMIDE 20 MG/1
20 TABLET ORAL 2 TIMES DAILY
COMMUNITY
End: 2019-10-28

## 2019-10-22 RX ORDER — ACETAMINOPHEN 325 MG/1
650 TABLET ORAL EVERY 6 HOURS PRN
Status: DISCONTINUED | OUTPATIENT
Start: 2019-10-22 | End: 2019-10-25

## 2019-10-22 NOTE — ED NOTES
Pt states bilateral lower extremity swelling and redness- since yesterday- states bilateral sores t0o both legs x 2 weeks. Denies trauma, fevers. + radial pulses.

## 2019-10-22 NOTE — ED INITIAL ASSESSMENT (HPI)
Pt c/o \"severe\" pain to right calf, worsened overnight. Pt with worsening bilateral lower leg edema, hx of cirrhosis.

## 2019-10-22 NOTE — ED PROVIDER NOTES
Patient Seen in: [facility] AND [facility] Emergency Department      History   Patient presents with:  Swelling Edema (cardiovascular, metabolic)    Stated Complaint: swelling to both lower legs    HPI    The patient is a 23-year-old male who is had increased sw Types: 40 Cans of beer per week      Comment: glass of wine daily sometimes    Drug use: No             Review of Systems    Positive for stated complaint: swelling to both lower legs  Other systems are as noted in HPI.   Constitutional and vital signs Findings: Erythema (Both lower extremities but right extending up over the knee) present. Neurological:      General: No focal deficit present. Mental Status: He is alert and oriented to person, place, and time. Sensory: No sensory deficit. Patient with cellulitis of the right lower extremity, IV Clinda started in the ED. Case discussed with pink Elisabeth and infectious disease and patient will be admitted for IV antibiotics.   Vital signs stable prior to admission        MDM     Pulse Ox: 95%,

## 2019-10-22 NOTE — H&P
[facility]    PATIENT'S NAME: Si Body   ATTENDING PHYSICIAN: Beltran Garcia MD   PATIENT ACCOUNT#:   [de-identified]    LOCATION:  [place] [address]  MEDICAL RECORD #:   [id number]       YOB: 1945  ADMISSION DATE:       10/22 12-point review of systems is negative. PHYSICAL EXAMINATION:    GENERAL:  Alert. Oriented to time, place, and person. Moderate distress.    VITAL SIGNS:  Temperature 98.5, pulse 66, respiratory rate 20, blood pressure 117/66, pulse ox 96% on room ai

## 2019-10-22 NOTE — TELEPHONE ENCOUNTER
Per King ANTHONY, pt to go to ER. Advised spouse and she agrees to plan and will take pt to [place] [address] ER now.

## 2019-10-22 NOTE — TELEPHONE ENCOUNTER
Action Requested: Summary for Provider     []  Critical Lab, Recommendations Needed  [] Need Additional Advice  []   FYI    []   Need Orders  [] Need Medications Sent to Pharmacy  []  Other     SUMMARY:     Per spouse pt had seen PA 10/18/19 for leg swelling and open wounds. Spouse states last night pt developed redness at right calf and fever of 100.0  Pt reports last night site was painful, but not today. Pt also reports warmth at site. Spouse states both legs have edema. Denies difficulty breathing. Advised ER for further eval and pt spouse prefers to check with PA first in regards to recommendations. Please advise.        Reason for call: Acute  Onset: Data Unavailable

## 2019-10-22 NOTE — ED NOTES
Orders for admission, patient is aware of plan and ready to go upstairs. Any questions, please call ED RN Gwen Carlson  at extension [phone].

## 2019-10-23 PROCEDURE — 99233 SBSQ HOSP IP/OBS HIGH 50: CPT | Performed by: HOSPITALIST

## 2019-10-23 RX ORDER — CEFAZOLIN SODIUM/WATER 2 G/20 ML
2 SYRINGE (ML) INTRAVENOUS EVERY 8 HOURS
Status: DISCONTINUED | OUTPATIENT
Start: 2019-10-23 | End: 2019-10-25

## 2019-10-23 RX ORDER — LORAZEPAM 1 MG/1
1 TABLET ORAL
Status: DISCONTINUED | OUTPATIENT
Start: 2019-10-23 | End: 2019-10-25

## 2019-10-23 RX ORDER — LORAZEPAM 2 MG/ML
2 INJECTION INTRAMUSCULAR
Status: DISCONTINUED | OUTPATIENT
Start: 2019-10-23 | End: 2019-10-25

## 2019-10-23 RX ORDER — LORAZEPAM 1 MG/1
2 TABLET ORAL
Status: DISCONTINUED | OUTPATIENT
Start: 2019-10-23 | End: 2019-10-25

## 2019-10-23 RX ORDER — POTASSIUM CHLORIDE 14.9 MG/ML
20 INJECTION INTRAVENOUS ONCE
Status: COMPLETED | OUTPATIENT
Start: 2019-10-23 | End: 2019-10-24

## 2019-10-23 RX ORDER — LACTULOSE 10 G/15ML
20 SOLUTION ORAL 2 TIMES DAILY
Status: DISCONTINUED | OUTPATIENT
Start: 2019-10-23 | End: 2019-10-25

## 2019-10-23 RX ORDER — LORAZEPAM 2 MG/ML
1 INJECTION INTRAMUSCULAR
Status: DISCONTINUED | OUTPATIENT
Start: 2019-10-23 | End: 2019-10-25

## 2019-10-23 NOTE — PLAN OF CARE
Problem: Patient/Family Goals  Goal: Patient/Family Long Term Goal  Description  Patient's Long Term Goal: \" go home\"    Interventions:  - IV antibiotics  - wound care    - See additional Care Plan goals for specific interventions  Outcome: Progressing

## 2019-10-23 NOTE — PROGRESS NOTES
[facility]    Progress Note    Megha  Patient Status:  Inpatient    1945 MRN [id number]   Location [facility] 5SW/SE Attending Elsa Villalpando MD   Hosp Day # 1 PCP David Machado MD        Subjective: 10/23/2019    CA 8.7 10/23/2019    ALB 3.8 05/15/2019    ALKPHO 293 (H) 05/15/2019    BILT 1.6 05/15/2019    TP 8.0 05/15/2019    AST 29 05/15/2019    ALT 31 05/15/2019    PTT 36.9 (H) 09/14/2018    INR 1.13 05/15/2019    TSH 2.52 04/11/2018    LIP 33 04/2

## 2019-10-23 NOTE — CONSULTS
INFECTIOUS DISEASE CONSULT NOTE    Joselin Dueñas Patient Status:  Inpatient    1945 MRN [id number]   Location [facility] 5SW/SE Attending [place] Villalpando MD   Hosp Day # 1 PCP Phi Repeat in 5/2019   • Unspecified essential hypertension     drug therapy     Past Surgical History:   Procedure Laterality Date   • CABG  07/2011   • COLONOSCOPY     • ESOPHAGOGASTRODUODENOSCOPY (EGD) N/A 10/17/2018    Performed by Oren Foley MD at E PRN  •  acetaminophen (TYLENOL) tab 650 mg, 650 mg, Oral, Q4H PRN **OR** HYDROcodone-acetaminophen (NORCO) 5-325 MG per tab 1 tablet, 1 tablet, Oral, Q4H PRN **OR** HYDROcodone-acetaminophen (NORCO) 5-325 MG per tab 2 tablet, 2 tablet, Oral, Q4H PRN  •  te RRR  Abdomen: Soft, nontender, mild distension. Musculoskeletal: +BLE edema L>R with +DP pulse bilaterally; R knee with TKA scar with good ROM of knee/ankle  Integument: RLE erythema, warmth, mild ttp. Posterior calf superficial clean ulcer.  Healed scabs discussed with patient, RN    Thank you for allowing me to participate in the care of this patient. Please do not hesitate to call if you have any questions. I will continue to follow with you and will make further recommendations based on his progress.

## 2019-10-23 NOTE — WOUND PROGRESS NOTE
WOUND CARE NOTE    History:  Past Medical History:   Diagnosis Date   • Basal cell carcinoma 2010    ear surgery   • Depression    • Disorder of liver     Cirrhosis   • Elevated PSA 2008    prostate biopsies, bx neg for cancer   • Epistaxis 2010    Tracey Gates Palpable Palpable        Extremity colors, hair growth and conditions     Extremity Color WNL WNL   Hair Growth on Extremity No No   Temperature of Extremity Warm Warm   Capillary Refill N/A N/A   Erythema No No   Dependent Rubor No No   Hyperpigmentation 10/23/2019    .0 (L) 10/23/2019    CREATSERUM 1.02 10/23/2019    BUN 20 (H) 10/23/2019     10/23/2019    K 3.0 (L) 10/23/2019     10/23/2019    CO2 29.0 10/23/2019    GLU 90 10/23/2019    CA 8.7 10/23/2019    ALB 3.8 05/15/2019    ALKPHO

## 2019-10-23 NOTE — CM/SW NOTE
Patient failed inpatient criteria. Second level of review completed and supports observation. UR committee in agreement. Discussed with Dr. Das Peers  who approves observation status. MOON given to the patient and order written.

## 2019-10-24 PROCEDURE — 99226 SUBSEQUENT OBSERVATION CARE: CPT | Performed by: HOSPITALIST

## 2019-10-24 RX ORDER — POTASSIUM CHLORIDE 20 MEQ/1
40 TABLET, EXTENDED RELEASE ORAL EVERY 4 HOURS
Status: COMPLETED | OUTPATIENT
Start: 2019-10-24 | End: 2019-10-24

## 2019-10-24 NOTE — PROGRESS NOTES
[facility] ID PROGRESS NOTE    Ebonie Merino Patient Status:  Observation    1945 MRN [id number]   Location [facility] 5SW/SE Attending Musa Ray MD   Hosp Day # 1 PCP Zachariah De La Cruz MD     Subjective:  Tmax 99.3 this AM. Has bee coronary bypass graft of native heart with unstable angina pectoris (HCC)     Lymphadenopathy     Acute midline low back pain without sciatica     Weakness     Current moderate episode of major depressive disorder without prior episode (HCC)     Irregular

## 2019-10-24 NOTE — PLAN OF CARE
Problem: CARDIOVASCULAR - ADULT  Goal: Maintains optimal cardiac output and hemodynamic stability  Description  INTERVENTIONS:  - Monitor vital signs, rhythm, and trends  - Monitor for bleeding, hypotension and signs of decreased cardiac output  - Evalua within normal limits  Description  INTERVENTIONS:  - Monitor labs and rhythm and assess patient for signs and symptoms of electrolyte imbalances  - Administer electrolyte replacement as ordered  - Monitor response to electrolyte replacements, including rhy

## 2019-10-24 NOTE — PLAN OF CARE
Problem: Patient/Family Goals  Goal: Patient/Family Long Term Goal  Description  Patient's Long Term Goal:     Interventions:  -   - See additional Care Plan goals for specific interventions  Outcome: Progressing  Goal: Patient/Family Short Term Goal  Terril Dance administer replacement therapy as ordered  Outcome: Progressing     Problem: METABOLIC/FLUID AND ELECTROLYTES - ADULT  Goal: Electrolytes maintained within normal limits  Description  INTERVENTIONS:  - Monitor labs and rhythm and assess patient for signs a

## 2019-10-24 NOTE — PROGRESS NOTES
[facility]    Progress Note    Fredy Marylou Patient Status:  Inpatient    1945 MRN [id number]   Location [facility] 5SW/SE Attending Obi Reinoso MD   Hosp Day # 1 PCP Tanisha Malone MD        Subjective:  10/24/2019    K 4.0 10/24/2019     10/24/2019    CO2 28.0 10/24/2019    GLU 90 10/24/2019    CA 8.4 (L) 10/24/2019    ALB 3.8 05/15/2019    ALKPHO 293 (H) 05/15/2019    BILT 1.6 05/15/2019    TP 8.0 05/15/2019    AST 29 05/15/2019    ALT 31 05

## 2019-10-25 VITALS
BODY MASS INDEX: 21.63 KG/M2 | TEMPERATURE: 99 F | SYSTOLIC BLOOD PRESSURE: 100 MMHG | OXYGEN SATURATION: 97 % | RESPIRATION RATE: 18 BRPM | HEIGHT: 71 IN | HEART RATE: 69 BPM | DIASTOLIC BLOOD PRESSURE: 51 MMHG | WEIGHT: 154.5 LBS

## 2019-10-25 PROCEDURE — 99217 OBSERVATION CARE DISCHARGE: CPT | Performed by: HOSPITALIST

## 2019-10-25 RX ORDER — CEFADROXIL 1000 MG/1
1 TABLET ORAL 2 TIMES DAILY
Qty: 14 TABLET | Refills: 0 | Status: SHIPPED | OUTPATIENT
Start: 2019-10-25 | End: 2019-11-01

## 2019-10-25 RX ORDER — HYDROCODONE BITARTRATE AND ACETAMINOPHEN 5; 325 MG/1; MG/1
1 TABLET ORAL EVERY 6 HOURS PRN
Qty: 10 TABLET | Refills: 0 | Status: SHIPPED | OUTPATIENT
Start: 2019-10-25 | End: 2019-12-18

## 2019-10-25 NOTE — PLAN OF CARE
Problem: CARDIOVASCULAR - ADULT  Goal: Maintains optimal cardiac output and hemodynamic stability  Description  INTERVENTIONS:  - Monitor vital signs, rhythm, and trends  - Monitor for bleeding, hypotension and signs of decreased cardiac output  - Evalua for areas of redness and/or skin breakdown  - Initiate interventions, skin care algorithm/standards of care as needed  10/25/2019 0249 by Yisel Rainey, RN  Outcome: Progressing     Problem: SKIN/TISSUE INTEGRITY - ADULT  Goal: Incision(s), wound

## 2019-10-25 NOTE — PROGRESS NOTES
[facility] ID PROGRESS NOTE    Kalpanasrinath Rosenberg Patient Status:  Observation    1945 MRN [id number]   Location [facility] 5SW/SE Attending Lynne Day MD   Hosp Day # 1 PCP Trice Maloney MD     Subjective:  Awake, afebrile.  No pain i heart failure ([facility] [address]     Lingular pneumonia     Coronary artery disease involving coronary bypass graft of native heart with unstable angina pectoris ([facility] [address]     Lymphadenopathy     Acute midline low back pain without sciatica     Weakness     Current [facility]  [phone]  10/24/2019

## 2019-10-26 RX ORDER — PANTOPRAZOLE SODIUM 40 MG/1
TABLET, DELAYED RELEASE ORAL
Qty: 90 TABLET | Refills: 1 | Status: SHIPPED | OUTPATIENT
Start: 2019-10-26 | End: 2019-12-24

## 2019-10-26 NOTE — DISCHARGE SUMMARY
[address] [place] [address] NAME: Marjorie Mckeon   ATTENDING PHYSICIAN: Jocelyn Fournier MD   PATIENT ACCOUNT#:   [id number]    LOCATION:  [address] [place] [address] #:   [id number]       YOB: 1945  ADMISSION DATE:       10/22 Extraocular movements are intact. Pupils equal, round, and reactive to light and accommodation. Atraumatic, normocephalic. CARDIAC:  S1, S2.  Regular rate and rhythm. LUNGS:  Good air entry bilaterally. ABDOMEN:  Soft, nontender, nondistended.   Cisco Skipper

## 2019-10-26 NOTE — TELEPHONE ENCOUNTER
Refill passed per [facility] protocol.   Refill Protocol Appointment Criteria  · Appointment scheduled in the past 12 months or in the next 3 months  Recent Outpatient Visits            1 week ago Ulcers of both lower legs ([facility]    [address] [facility]

## 2019-10-28 ENCOUNTER — PATIENT OUTREACH (OUTPATIENT)
Dept: CASE MANAGEMENT | Age: 74
End: 2019-10-28

## 2019-10-28 NOTE — PROGRESS NOTES
[facility]  [facility] [address]  [address]  [phone] ([phone]) Hearing impairment    • Heart attack ([facility]    • High blood pressure    • High cholesterol    • Hx of CABG 2011    times 4   • Knee torn cartilage     open surgery   • Leukemia ([facility] 1983    acute lymphoblastic leukemia; treated with chemotherapy, radiation mg by mouth daily. , Disp: , Rfl:   eplerenone 25 MG Oral Tab, Take 2 tablets (50 mg total) by mouth daily. , Disp: , Rfl: 0  atorvastatin 40 MG Oral Tab, Take 1 tablet (40 mg total) by mouth once daily. , Disp: 90 tablet, Rfl: 3  folic acid 1 MG Oral Tab, Ta A&Ox3, no asterixis  Psych: normal affect/mood    Assessment and Plan:  75year old with alcohol cirrhosis complicated by some volume overload and likely some low grade encephalopathy     - Doing well from volume standpoint.  Recently started on torsemide 2

## 2019-10-28 NOTE — PROGRESS NOTES
NCM called for TCM call. Patient driving at time of call, patient's wife picked up the phone. She preferred to call back once they are home after MD appts. Provided call back number.

## 2019-10-30 ENCOUNTER — OFFICE VISIT (OUTPATIENT)
Dept: WOUND CARE | Facility: HOSPITAL | Age: 74
End: 2019-10-30
Attending: CLINICAL NURSE SPECIALIST
Payer: MEDICARE

## 2019-10-30 ENCOUNTER — PATIENT MESSAGE (OUTPATIENT)
Dept: SURGERY | Facility: CLINIC | Age: 74
End: 2019-10-30

## 2019-10-30 ENCOUNTER — APPOINTMENT (OUTPATIENT)
Dept: LAB | Facility: HOSPITAL | Age: 74
End: 2019-10-30
Attending: INTERNAL MEDICINE
Payer: MEDICARE

## 2019-10-30 DIAGNOSIS — K70.30 ALCOHOLIC CIRRHOSIS, UNSPECIFIED WHETHER ASCITES PRESENT (HCC): ICD-10-CM

## 2019-10-30 DIAGNOSIS — I87.2 VENOUS INSUFFICIENCY: Primary | ICD-10-CM

## 2019-10-30 DIAGNOSIS — S81.802A LEG WOUND, LEFT, INITIAL ENCOUNTER: ICD-10-CM

## 2019-10-30 DIAGNOSIS — S81.801A LEG WOUND, RIGHT, INITIAL ENCOUNTER: ICD-10-CM

## 2019-10-30 PROCEDURE — 80053 COMPREHEN METABOLIC PANEL: CPT

## 2019-10-30 PROCEDURE — 36415 COLL VENOUS BLD VENIPUNCTURE: CPT

## 2019-10-30 PROCEDURE — 82105 ALPHA-FETOPROTEIN SERUM: CPT

## 2019-10-30 PROCEDURE — 99215 OFFICE O/P EST HI 40 MIN: CPT

## 2019-10-30 NOTE — PROGRESS NOTES
Subjective    Chief Complaint  This information was obtained from the patient  The patient is new to the [address] [facility] here for an initial visit for the evaluation and management of non-healing wound(s).     Allergies  Penicillins, Penicillin G (Reaction: Ra Elevated PSA, prostate biopsies (2008)  Myocardial Infarction (MI) (1990)  Acute Lymphoblastic Leukemia (1983)  Depression  Disorder of Liver  Hearing impairment  Heart attack  High blood pressure  High Cholesterol  Knee torn cartilage  Obesity  Hyperlipid Please fill out the PHQ9 if any indicators were positive on the PHQ2: 0  PHQ9 Depresssion screen scale: 0=not at all, 1=several days, 2=more than half the days, 3=nearly every day: 0  Little interest or pleasure in doing things: 0  Feeling down, depressed, Wound #1 Left, Anterior Lower Leg is a Partial Thickness Venous Ulcer and has received a status of Not Healed. Initial wound encounter measurements are 0.7cm length x 0.8cm width x 0.1cm depth, with an area of 0.56 sq cm and a volume of 0.056 cubic cm.  No Height/Length: 71 in (180.34 cm), Weight: 185 lbs (84.09 kgs), BMI: 25.8, Temperature: 98.1 °F (36.72 °C), Pulse: 80 bpm, Respiratory Rate: 16 breaths/min, Blood Pressure: 117/72 mmHg, Pulse Oximetry: 97 %.     Physical Exam  Respiratory:  Respiration regul (Encounter Diagnosis) S81.802A - Unspecified open wound, left lower leg, initial encounter    Diagnoses    ICD-10  I87.2: Venous insufficiency (chronic) (peripheral)  S81.801A: Unspecified open wound, right lower leg, initial encounter  S81.802A: Unspecifi Wound Orders:  Wound #1 Left, Anterior Lower Leg    Follow-Up Appointments  Return Appointment in 1 week. - APN/RN to be scheduled the week of November 11th x30 min x 3 visits    Wound Cleansing & Dressings  Clean wound with soap and water.   Xeroform - cov Wound Location: Left, Anterior Lower Leg Right, Posterior Lower Leg   Wound Type: Venous Ulcer Venous Ulcer   Date Acquired: 4/30/2019 4/30/2019   Wound Status: Not Healed Not Healed   Measurements L x W x D (cm) 0.7x0.8x0.1 3x1.5x0.1   Hypergranulated: No Compression applied to: using Toe bases to tibial tubercle (1)  Compression used: using SpandaGrip F (1)      Electronic Signature(s)  Signed By: Date:  Марина Hernandez 10/30/2019 9:35:11 AM       Entered By: Марина Hernandez on 10/30/2019 9:24:01 AM

## 2019-11-13 ENCOUNTER — OFFICE VISIT (OUTPATIENT)
Dept: WOUND CARE | Facility: HOSPITAL | Age: 74
End: 2019-11-13
Attending: CLINICAL NURSE SPECIALIST
Payer: MEDICARE

## 2019-11-13 DIAGNOSIS — I87.2 VENOUS INSUFFICIENCY: ICD-10-CM

## 2019-11-13 PROCEDURE — 99214 OFFICE O/P EST MOD 30 MIN: CPT

## 2019-11-20 ENCOUNTER — OFFICE VISIT (OUTPATIENT)
Dept: WOUND CARE | Facility: HOSPITAL | Age: 74
End: 2019-11-20
Attending: CLINICAL NURSE SPECIALIST
Payer: MEDICARE

## 2019-11-20 DIAGNOSIS — I87.2 VENOUS INSUFFICIENCY: ICD-10-CM

## 2019-11-20 PROCEDURE — 99214 OFFICE O/P EST MOD 30 MIN: CPT

## 2019-11-27 ENCOUNTER — OFFICE VISIT (OUTPATIENT)
Dept: WOUND CARE | Facility: HOSPITAL | Age: 74
End: 2019-11-27
Attending: CLINICAL NURSE SPECIALIST
Payer: MEDICARE

## 2019-11-27 DIAGNOSIS — S81.801D LEG WOUND, RIGHT, SUBSEQUENT ENCOUNTER: Primary | ICD-10-CM

## 2019-11-27 DIAGNOSIS — I87.2 VENOUS INSUFFICIENCY: ICD-10-CM

## 2019-11-27 PROCEDURE — 99213 OFFICE O/P EST LOW 20 MIN: CPT

## 2019-11-27 NOTE — PROGRESS NOTES
Subjective    Chief Complaint  This information was obtained from the patient  The patient is new to the [address] [facility] here for an initial visit for the evaluation and management of non-healing wound(s).  11/27/19 no additional concerns    Allergies  Penicil Patient denies complaints or symptoms related to:  Constitutional Symptoms (General Health):  Fatigue, Fever, Weakness  Respiratory: Cough, Shortness of Breath, Wheezing  Cardiovascular (Central/Peripheral): Chest Pain  Gastrointestinal (GI): Change in Nikolay Patient's right lower leg wound healed with epithelialization present. Small scab noted. Instructed patient to apply Vaseline daily to scab area and moisturize dry skin at bedtime. Instructed patient to put Tubigrip on in morning and off at bedtime.   Ruth Jimenez

## 2019-12-04 ENCOUNTER — APPOINTMENT (OUTPATIENT)
Dept: WOUND CARE | Facility: HOSPITAL | Age: 74
End: 2019-12-04
Attending: CLINICAL NURSE SPECIALIST
Payer: MEDICARE

## 2019-12-11 ENCOUNTER — APPOINTMENT (OUTPATIENT)
Dept: WOUND CARE | Facility: HOSPITAL | Age: 74
End: 2019-12-11
Attending: CLINICAL NURSE SPECIALIST
Payer: MEDICARE

## 2019-12-11 ENCOUNTER — HOSPITAL ENCOUNTER (EMERGENCY)
Facility: HOSPITAL | Age: 74
Discharge: HOME | End: 2019-12-11
Attending: EMERGENCY MEDICINE
Payer: MEDICARE

## 2019-12-11 VITALS
BODY MASS INDEX: 25 KG/M2 | DIASTOLIC BLOOD PRESSURE: 65 MMHG | WEIGHT: 180 LBS | OXYGEN SATURATION: 99 % | HEART RATE: 66 BPM | TEMPERATURE: 99 F | SYSTOLIC BLOOD PRESSURE: 110 MMHG | RESPIRATION RATE: 18 BRPM

## 2019-12-11 DIAGNOSIS — I83.899 SYMPTOMATIC SPIDER VARICOSE VEIN: Primary | ICD-10-CM

## 2019-12-11 DIAGNOSIS — R60.9 PERIPHERAL EDEMA: ICD-10-CM

## 2019-12-11 PROCEDURE — 99283 EMERGENCY DEPT VISIT LOW MDM: CPT

## 2019-12-11 PROCEDURE — 85610 PROTHROMBIN TIME: CPT | Performed by: EMERGENCY MEDICINE

## 2019-12-11 PROCEDURE — 85025 COMPLETE CBC W/AUTO DIFF WBC: CPT | Performed by: EMERGENCY MEDICINE

## 2019-12-11 PROCEDURE — 85730 THROMBOPLASTIN TIME PARTIAL: CPT | Performed by: EMERGENCY MEDICINE

## 2019-12-11 NOTE — ED INITIAL ASSESSMENT (HPI)
For the last month pt has been having 'random burst of bleeding on my left leg. Atraumatic. The tiny vein in my leg bursts and squirts a lot of blood that I put a plastic bag over my leg to catch it. Usually takes 10 minutes to stop bleeding'.  On 81mg asa,

## 2019-12-12 NOTE — ED PROVIDER NOTES
Patient Seen in: [facility] AND [facility] Emergency Department    History   Patient presents with:  Bleeding    Stated Complaint: bleeding veins     HPI    76-year-old male with past medical history of hypertension, dyslipidemia, cirrhosis, CAD status post CABG o daily.   torsemide 20 MG Oral Tab,  Take 40 mg (2 tablets) in the morning and 20 mg (1 tablet) in the evening   PANTOPRAZOLE SODIUM 40 MG Oral Tab EC,  TAKE 1 TABLET(40 MG) BY MOUTH EVERY DAY   HYDROcodone-acetaminophen 5-325 MG Oral Tab,  Take 1 tablet by Triage Vitals [12/11/19 1646]   /69   Pulse 69   Resp 18   Temp 98.9 °F (37.2 °C)   Temp src    SpO2 97 %   O2 Device        Current:/74   Pulse 58   Temp 98.9 °F (37.2 °C)   Resp 18   Wt 81.6 kg   SpO2 99%   BMI 25.10 kg/m²         Physical Ex Advised to use more compressive stocking and of nonacute labs, will provide prn CoBan and surgicel for bleeding recurrence and advised on close/ongoing PCP followup for possible uptitration in diuretics.     Disposition and Plan     Clinical Impression:  Everardo

## 2019-12-18 ENCOUNTER — APPOINTMENT (OUTPATIENT)
Dept: WOUND CARE | Facility: HOSPITAL | Age: 74
End: 2019-12-18
Attending: CLINICAL NURSE SPECIALIST
Payer: MEDICARE

## 2019-12-24 RX ORDER — PANTOPRAZOLE SODIUM 40 MG/1
TABLET, DELAYED RELEASE ORAL
Qty: 90 TABLET | Refills: 1 | Status: SHIPPED | OUTPATIENT
Start: 2019-12-24 | End: 2020-06-17

## 2019-12-24 NOTE — TELEPHONE ENCOUNTER
Refill passed per [facility] protocol.   Refill Protocol Appointment Criteria  · Appointment scheduled in the past 12 months or in the next 3 months  Recent Outpatient Visits            6 days ago Chronic diastolic CHF (congestive heart failure) ([facility]

## 2020-01-21 ENCOUNTER — AUDIOLOGY DOCUMENTATION (OUTPATIENT)
Dept: AUDIOLOGY | Facility: CLINIC | Age: 75
End: 2020-01-21

## 2020-01-21 NOTE — PROGRESS NOTES
Hearing Aid Information:   Cong Pizarro [id number]  Right #[id number]  Left #[id number]  Coupled to custom c-shells  Date dispensed:  Elsewhere in 2015  Wax Guard: Cerustop  Battery: 312    Patient dropped off left aid and c-shell with broken wire.    (Similar

## 2020-04-09 ENCOUNTER — HOSPITAL ENCOUNTER (OUTPATIENT)
Dept: ULTRASOUND IMAGING | Age: 75
Discharge: HOME | End: 2020-04-09
Attending: INTERNAL MEDICINE
Payer: MEDICARE

## 2020-04-09 DIAGNOSIS — K70.30 ALCOHOLIC CIRRHOSIS, UNSPECIFIED WHETHER ASCITES PRESENT (HCC): ICD-10-CM

## 2020-04-09 PROCEDURE — 76705 ECHO EXAM OF ABDOMEN: CPT | Performed by: INTERNAL MEDICINE

## 2020-06-17 RX ORDER — PANTOPRAZOLE SODIUM 40 MG/1
TABLET, DELAYED RELEASE ORAL
Qty: 90 TABLET | Refills: 1 | Status: SHIPPED | OUTPATIENT
Start: 2020-06-17

## 2020-07-07 ENCOUNTER — TELEPHONE (OUTPATIENT)
Dept: SURGERY | Facility: CLINIC | Age: 75
End: 2020-07-07

## 2020-07-07 NOTE — TELEPHONE ENCOUNTER
Spoke with PCP and patient's wife as well    Taking lactulose but not moving bowels as much. More fatigued, napping more. Losing some weight. Has increased ascites. Inguinal hernia, intermittently bothersome. Has some ascites within it.  Seeing surgeon on F

## 2020-07-13 NOTE — PROGRESS NOTES
Before the initiation of today's documented service, the patient or patient guardian verbally consented to virtual/remote treatment. More than 50% of the listed time was spent counseling or coordinating care.   The topics discussed are listed in the assessm hernia and had CT done and saw surgeon and appears to just be hydrocele/tracking ascites.  No surgical repair plan  - Not moving bowels - not taking lactulose regularly as he reports he can get runs at [address] [employer] more, very fatigued  - Still drinking Genito-Urinary Disorder Neg         Urolithiasis     Medications  Current Outpatient Medications   Medication Sig Dispense Refill   • furosemide (LASIX) 40 MG Oral Tab Take 1 tablet (40 mg total) by mouth every evening.   0   • PANTOPRAZOLE SODIUM 40 MG Ora No    Exam:  N/A    Assessment and Plan:  75year old with alcohol cirrhosis complicated by some volume overload and likely some low grade encephalopathy    # Ascites/edema   - Wife and patient report actually doing OK today and swelling down significantly (office)  [phone] (fax)  [phone] (mobile)  [email or web address]

## 2020-07-16 ENCOUNTER — TELEPHONE (OUTPATIENT)
Dept: HEMATOLOGY/ONCOLOGY | Facility: HOSPITAL | Age: 75
End: 2020-07-16

## 2020-07-16 NOTE — TELEPHONE ENCOUNTER
Call to patient. Reached spouse's voice mail. LM requesting Trae to make a follow up appointment with Dr Warner Wilkes. We have not seen him in over 2 years so in order to review the scan, Dr Warner Wilkes should see him in clinic.  He was to follow up with us 3 michael

## 2020-07-16 NOTE — TELEPHONE ENCOUNTER
Celsa wilkins says his primary Dr, Dr Peewee Steven from [facility] would like Dr Erich Alan to review patients CT.  Thank you Wong Villasenor

## 2020-07-30 ENCOUNTER — OFFICE VISIT (OUTPATIENT)
Dept: HEMATOLOGY/ONCOLOGY | Facility: HOSPITAL | Age: 75
End: 2020-07-30
Attending: INTERNAL MEDICINE
Payer: MEDICARE

## 2020-07-30 VITALS
TEMPERATURE: 97 F | SYSTOLIC BLOOD PRESSURE: 116 MMHG | WEIGHT: 166 LBS | RESPIRATION RATE: 16 BRPM | DIASTOLIC BLOOD PRESSURE: 63 MMHG | BODY MASS INDEX: 23.24 KG/M2 | HEART RATE: 61 BPM | OXYGEN SATURATION: 95 % | HEIGHT: 71 IN

## 2020-07-30 DIAGNOSIS — Z87.898 HISTORY OF LYMPHADENOPATHY: ICD-10-CM

## 2020-07-30 DIAGNOSIS — K70.31 ALCOHOLIC CIRRHOSIS OF LIVER WITH ASCITES (HCC): ICD-10-CM

## 2020-07-30 DIAGNOSIS — Z85.6 HISTORY OF ACUTE LYMPHOBLASTIC LEUKEMIA (ALL): Primary | ICD-10-CM

## 2020-07-30 PROCEDURE — 99214 OFFICE O/P EST MOD 30 MIN: CPT | Performed by: INTERNAL MEDICINE

## 2020-07-30 NOTE — PROGRESS NOTES
Cancer Center Progress Note    Patient Name: Judah Azul   YOB: 1945   Medical Record Number: [id number]   CSN: [id number]   Consulting Physician: Jackelyn Gomes MD  Referring Physician(s): No ref.  provider found  Date of Visit: 7/30/202 of increased liver enzymes concerning for alcohol-related disease. Roscoe assist dyspnea on exertion and fatigue as his main symptoms. He denies recurrent fevers or infections, night sweats, lymphadenopathy, reports weight is now stabilized.   Santos Escalona is having cancer   • Epistaxis 2010    packing    • ETOH abuse 2011   • Gout 2013    meds in rehab   • H/O mastoidectomy    • Hearing impairment    • Heart attack ([address]    • High blood pressure    • High cholesterol    • Hx of CABG 2011    times 4   • Knee torn cart Inability: Not on file      Transportation needs:        Medical: Not on file        Non-medical: Not on file    Tobacco Use      Smoking status: Never Smoker      Smokeless tobacco: Never Used    Substance and Sexual Activity      Alcohol use:  Yes Penicillins             UNKNOWN    Comment:Other reaction(s): Unknown             Tolerated cefazolin 4/2019 and 10/2019  Penicillin G            RASH    Comment:Per Dr. Jarrett Olson MD, ok to give Zosyn.              Patient had some kind of reactio Neurological: Negative for headaches, dizziness, speech problems, gait problems and weakness. A comprehensive 14 point review of systems was completed. Pertinent positives and negatives noted in the the HPI.      Vital Signs:  /63 (BP Location: K 3.4 (L) 07/31/2020 02:46 PM     07/31/2020 02:46 PM    CO2 30.0 07/31/2020 02:46 PM    ALKPHO 239 (H) 07/31/2020 02:46 PM    AST 32 07/31/2020 02:46 PM    ALT 37 07/31/2020 02:46 PM       Imaging:    CT C/A/P 7/10/2020 at [facility] appearance. No previous study available at this institution for comparison. If a previous outside study is available, it can be submitted, and an addendum report will be generated with comparison.  If there is no previous study available, a follow-up CT sca WITH PLATELET, COMP         METABOLIC PANEL (14), CT         ABDOMEN+PELVIS(CONTRAST ONLY)(CPT=74177)    (X10.68) Alcoholic cirrhosis of liver with ascites (HCC)  Plan: CBC WITH DIFFERENTIAL WITH PLATELET, COMP         METABOLIC PANEL (14), CT         ABDO splenomegaly which is likely contributing to his thrombocytopenia and anemia    --Retroperitoneal lymphadenopathy similar in size to April imaging which occurred in [place], which is slightly enlarged.  Lab data do not suggest recurrence of acute leukemia possible hepatocellular cancer development          Jeancarlos Miranda MD  [facility]  [address] [place] [address] [address]  [address] [place] [address], [facility], [facility]

## 2020-07-31 ENCOUNTER — TELEPHONE (OUTPATIENT)
Dept: HEMATOLOGY/ONCOLOGY | Facility: HOSPITAL | Age: 75
End: 2020-07-31

## 2020-07-31 ENCOUNTER — LAB ENCOUNTER (OUTPATIENT)
Dept: LAB | Facility: HOSPITAL | Age: 75
End: 2020-07-31
Attending: INTERNAL MEDICINE
Payer: MEDICARE

## 2020-07-31 DIAGNOSIS — Z87.898 HISTORY OF LYMPHADENOPATHY: ICD-10-CM

## 2020-07-31 DIAGNOSIS — Z85.6 HISTORY OF ACUTE LYMPHOBLASTIC LEUKEMIA (ALL): ICD-10-CM

## 2020-07-31 DIAGNOSIS — K70.31 ALCOHOLIC CIRRHOSIS OF LIVER WITH ASCITES (HCC): ICD-10-CM

## 2020-07-31 LAB
ALBUMIN SERPL-MCNC: 4 G/DL (ref 3.4–5)
ALBUMIN/GLOB SERPL: 0.8 {RATIO} (ref 1–2)
ALP LIVER SERPL-CCNC: 239 U/L (ref 45–117)
ALT SERPL-CCNC: 37 U/L (ref 16–61)
ANION GAP SERPL CALC-SCNC: 4 MMOL/L (ref 0–18)
AST SERPL-CCNC: 32 U/L (ref 15–37)
BASOPHILS # BLD AUTO: 0.04 X10(3) UL (ref 0–0.2)
BASOPHILS NFR BLD AUTO: 0.7 %
BILIRUB SERPL-MCNC: 1.2 MG/DL (ref 0.1–2)
BUN BLD-MCNC: 19 MG/DL (ref 7–18)
BUN/CREAT SERPL: 20 (ref 10–20)
CALCIUM BLD-MCNC: 9.1 MG/DL (ref 8.5–10.1)
CHLORIDE SERPL-SCNC: 101 MMOL/L (ref 98–112)
CO2 SERPL-SCNC: 30 MMOL/L (ref 21–32)
CREAT BLD-MCNC: 0.95 MG/DL (ref 0.7–1.3)
DEPRECATED RDW RBC AUTO: 48.5 FL (ref 35.1–46.3)
EOSINOPHIL # BLD AUTO: 0.26 X10(3) UL (ref 0–0.7)
EOSINOPHIL NFR BLD AUTO: 4.3 %
ERYTHROCYTE [DISTWIDTH] IN BLOOD BY AUTOMATED COUNT: 14.9 % (ref 11–15)
GLOBULIN PLAS-MCNC: 5 G/DL (ref 2.8–4.4)
GLUCOSE BLD-MCNC: 89 MG/DL (ref 70–99)
HCT VFR BLD AUTO: 38.2 % (ref 39–53)
HGB BLD-MCNC: 12.7 G/DL (ref 13–17.5)
IMM GRANULOCYTES # BLD AUTO: 0.01 X10(3) UL (ref 0–1)
IMM GRANULOCYTES NFR BLD: 0.2 %
LYMPHOCYTES # BLD AUTO: 1.54 X10(3) UL (ref 1–4)
LYMPHOCYTES NFR BLD AUTO: 25.5 %
M PROTEIN MFR SERPL ELPH: 9 G/DL (ref 6.4–8.2)
MCH RBC QN AUTO: 29.8 PG (ref 26–34)
MCHC RBC AUTO-ENTMCNC: 33.2 G/DL (ref 31–37)
MCV RBC AUTO: 89.7 FL (ref 80–100)
MONOCYTES # BLD AUTO: 0.55 X10(3) UL (ref 0.1–1)
MONOCYTES NFR BLD AUTO: 9.1 %
NEUTROPHILS # BLD AUTO: 3.63 X10 (3) UL (ref 1.5–7.7)
NEUTROPHILS # BLD AUTO: 3.63 X10(3) UL (ref 1.5–7.7)
NEUTROPHILS NFR BLD AUTO: 60.2 %
OSMOLALITY SERPL CALC.SUM OF ELEC: 282 MOSM/KG (ref 275–295)
PATIENT FASTING Y/N/NP: NO
PLATELET # BLD AUTO: 159 10(3)UL (ref 150–450)
POTASSIUM SERPL-SCNC: 3.4 MMOL/L (ref 3.5–5.1)
RBC # BLD AUTO: 4.26 X10(6)UL (ref 3.8–5.8)
SODIUM SERPL-SCNC: 135 MMOL/L (ref 136–145)
WBC # BLD AUTO: 6 X10(3) UL (ref 4–11)

## 2020-07-31 PROCEDURE — 85025 COMPLETE CBC W/AUTO DIFF WBC: CPT

## 2020-07-31 PROCEDURE — 36415 COLL VENOUS BLD VENIPUNCTURE: CPT

## 2020-07-31 PROCEDURE — 80053 COMPREHEN METABOLIC PANEL: CPT

## 2020-07-31 NOTE — TELEPHONE ENCOUNTER
Confirmed with spouse, pt will go for labs today. Spouse understands to schedule CT scan in October prior to f/u appt.

## 2020-08-17 ENCOUNTER — TELEPHONE (OUTPATIENT)
Dept: GASTROENTEROLOGY | Facility: CLINIC | Age: 75
End: 2020-08-17

## 2020-08-17 NOTE — TELEPHONE ENCOUNTER
----- Message from Michael Babb, [address] sent at 10/22/2018  8:44 AM CDT -----  Regarding: Recall EGD  Recall EGD for 2 years per SDK.  EGD done 10/17/18

## 2020-08-26 ENCOUNTER — TELEPHONE (OUTPATIENT)
Dept: GASTROENTEROLOGY | Facility: CLINIC | Age: 75
End: 2020-08-26

## 2020-08-27 NOTE — TELEPHONE ENCOUNTER
Umesh Kothari   PATHOLOGY/CYTOLOGY RESULT   MRN: [id number] Description: Male : 1945   PDF Results and Scans     Document on 10/17/2018  2:46 PM by Ronnie Rushing MD      Result   SURGICAL PATHOLOGY TISSUE (Order [id number])   Contains abnormal gracia Varices.   K74.60 Hepatic Cirrhosis, Unspecified Hepatic Cirrhosis Type, Unspecified Whether Ascites Present (Hcc).        Gross Description    Specimen A is submitted in formalin labeled “Vamsiw, duodenum polyp” and consists of five pink-tan, soft tissue f

## 2020-08-27 NOTE — TELEPHONE ENCOUNTER
Da Santiago MD   Physician   Gastroenterology   Operative Report   Signed   Date of Service:  10/17/2018  9:48 AM               Signed             ESOPHAGOGASTRODUODENOSCOPY (EGD) REPORT           Holly MOLINA 1945 Age 75year old   P 2. Stomach: The stomach distended normally. Normal rugal folds were seen. The pylorus was patent. The gastric mucosa appeared mildly erythematous s/p random biopsies. Retroflexion revealed a normal fundus and a non-patulous cardia.  No gastric varices prese

## 2020-08-27 NOTE — TELEPHONE ENCOUNTER
Forwarded to [facility]. Patient contacted. Meds/allergies reviewed.  Appears to have extensive medical history    Last Procedure:  egd Dr Eliel Murcia 10/17/18  Last Diagnosis:  irreg Z line, hiatal hernia/duodenal polyp  Recalled for (years): 2 yrs  Sedation u

## 2020-08-27 NOTE — TELEPHONE ENCOUNTER
Left message to call back with wife. Pt is due for CLN and EGD.     Last Procedure, Date, MD:  EGD, 10-, Ward Lal  Last Diagnosis:  Irregular z-line, hiatal hernia, duodenal polyp  Recalled for (mth/yrs):  2 years  Sedation used previously:   MAC  [place]

## 2020-08-31 NOTE — TELEPHONE ENCOUNTER
Spoke to pt and reviewed CI APRN message below. He accepted the following appt, directions provided to the [facility], and told to arrive 15 mins earlier to discuss CLN/EGD scheduling.      Future Appointments   Date Time Provider Nena Sevilla   9/8/2020 11:

## 2020-09-04 NOTE — PROGRESS NOTES
[facility], [facility] - Gastroenterology                                                                                                               Reason for consult: d no  Alcohol: yes-1.5 glasses of wine/day  Illicit drugs: no    No FH GI malignancy    No history of adverse reaction to sedation  No DAVONTE  No anticoagulants  No pacemaker/defibrillator  No pain medications and/or sleep aides    Wt Readings from Last 6 Encou Mother 67        cause of death   • Cancer Mother 76        cancer involving the bone   • Heart Disorder Sister         CAD (cause of death)   • Clotting Disorder Sister         history of DVT   • Genito-Urinary Disorder Neg         Urolithiasis      Socia Specific allergic reaction unaware.     ROS:   CONSTITUTIONAL: negative for fevers, chills, sweats and weight loss  EYES Negative for red eyes, yellow eyes, changes in vision  HEENT: Negative for dysphagia and hoarseness  RESPIRATORY: Negative for cough and gastropathy  · Diff-quik stain (with reactive positive control) is negative for Helicobacter pylori-like organisms     C.  Distal esophagus, z-line; biopsy:  · Gastric cardia type mucosa, negative for intestinal metaplasia  · No squamous mucosa identified perforation, infection, anesthesia complications, and even death. Patient will be NPO after midnight and will have a person physically present at time of pick-up to drive patient home.  Patient verbalized understanding and agrees to proceed with procedure a

## 2020-09-08 ENCOUNTER — TELEPHONE (OUTPATIENT)
Dept: GASTROENTEROLOGY | Facility: CLINIC | Age: 75
End: 2020-09-08

## 2020-09-08 DIAGNOSIS — I85.00 ESOPHAGEAL VARICES WITHOUT BLEEDING, UNSPECIFIED ESOPHAGEAL VARICES TYPE (HCC): ICD-10-CM

## 2020-09-08 DIAGNOSIS — K74.60 HEPATIC CIRRHOSIS, UNSPECIFIED HEPATIC CIRRHOSIS TYPE, UNSPECIFIED WHETHER ASCITES PRESENT (HCC): Primary | ICD-10-CM

## 2020-09-08 NOTE — PATIENT INSTRUCTIONS
-lactulose and miralax  No senokot  -continue pantoprazole 40 mg/daily  -labs  -see dr. Curtis Samson next week-stop drinking alcohol    Plan for egd w/ carmen w/ Dr. Estefany Engel.  Dx: cirrhosis, varices screening, duodenal adenomatous polyp

## 2020-09-08 NOTE — TELEPHONE ENCOUNTER
Scheduled for:  EGD 75[address]  Provider Name:  Terrance Field  Date:  9/30/20  Location:  [facility]  Sedation:  MAC  Time:  1230pm [id number]  Prep:  NPO after midnight  Meds/Allergies Reconciled?:  Leisa Chris reviewed    Diagnosis with codes:  Alcoholic Cirrhosis

## 2020-09-09 NOTE — TELEPHONE ENCOUNTER
Rescheduled for:  EGD 78124  Provider Name:  Dr. Theodora Sher  Date:    From-9/30/20  To-10/28/20  Location:    [facility]  Sedation:  MAC  Time:    From-1230  To-1130 (pt is aware to arrive at 1030)   Prep:  NPO after midnight  Meds/Allergies Reconciled?:  Physician re

## 2020-09-15 NOTE — PROGRESS NOTES
[facility]  [address], [address]  [address]  [phone] ([phone]) 2010    packing    • ETOH abuse 2011   • Gout 2013    meds in rehab   • H/O mastoidectomy    • Hearing impairment    • Heart attack ([facility] [address]    • High blood pressure    • High cholesterol    • Hx of CABG 2011    times 4   • Knee torn cartilage     open surger Take 1 tablet (50 mg total) by mouth daily. 90 tablet 1   • torsemide 20 MG Oral Tab Take 2 tablets (40 mg total) by mouth 2 (two) times daily. 56 tablet 0   • Metoprolol Succinate ER 25 MG Oral Tablet 24 Hr Take 1 tablet (25 mg total) by mouth nightly.  80 low grade encephalopathy     # Ascites/edema   - No current swelling. Continues on regimen involving two loop diuretics Lasix (that patient had left over) in addition to the torsemide he usually takes.  Previously discussed the similarities in the [facility]

## 2020-10-26 ENCOUNTER — APPOINTMENT (OUTPATIENT)
Dept: LAB | Facility: HOSPITAL | Age: 75
End: 2020-10-26
Attending: INTERNAL MEDICINE
Payer: MEDICARE

## 2020-10-26 ENCOUNTER — HOSPITAL ENCOUNTER (OUTPATIENT)
Dept: CT IMAGING | Facility: HOSPITAL | Age: 75
Discharge: HOME | End: 2020-10-26
Attending: INTERNAL MEDICINE
Payer: MEDICARE

## 2020-10-26 DIAGNOSIS — Z01.818 PRE-OP TESTING: ICD-10-CM

## 2020-10-26 DIAGNOSIS — K70.31 ALCOHOLIC CIRRHOSIS OF LIVER WITH ASCITES (HCC): ICD-10-CM

## 2020-10-26 DIAGNOSIS — Z85.6 HISTORY OF ACUTE LYMPHOBLASTIC LEUKEMIA (ALL): ICD-10-CM

## 2020-10-26 DIAGNOSIS — Z87.898 HISTORY OF LYMPHADENOPATHY: ICD-10-CM

## 2020-10-26 PROCEDURE — 82565 ASSAY OF CREATININE: CPT

## 2020-10-26 PROCEDURE — 74177 CT ABD & PELVIS W/CONTRAST: CPT | Performed by: INTERNAL MEDICINE

## 2020-10-28 ENCOUNTER — ANESTHESIA (OUTPATIENT)
Dept: ENDOSCOPY | Facility: HOSPITAL | Age: 75
End: 2020-10-28
Payer: MEDICARE

## 2020-10-28 ENCOUNTER — ANESTHESIA EVENT (OUTPATIENT)
Dept: ENDOSCOPY | Facility: HOSPITAL | Age: 75
End: 2020-10-28
Payer: MEDICARE

## 2020-10-28 ENCOUNTER — HOSPITAL ENCOUNTER (OUTPATIENT)
Facility: HOSPITAL | Age: 75
Setting detail: HOSPITAL OUTPATIENT SURGERY
Discharge: HOME | End: 2020-10-28
Attending: INTERNAL MEDICINE | Admitting: INTERNAL MEDICINE
Payer: MEDICARE

## 2020-10-28 ENCOUNTER — TELEPHONE (OUTPATIENT)
Dept: GASTROENTEROLOGY | Facility: CLINIC | Age: 75
End: 2020-10-28

## 2020-10-28 VITALS
DIASTOLIC BLOOD PRESSURE: 66 MMHG | WEIGHT: 175 LBS | TEMPERATURE: 98 F | HEIGHT: 71 IN | SYSTOLIC BLOOD PRESSURE: 114 MMHG | RESPIRATION RATE: 16 BRPM | OXYGEN SATURATION: 99 % | HEART RATE: 51 BPM | BODY MASS INDEX: 24.5 KG/M2

## 2020-10-28 DIAGNOSIS — R18.8 CIRRHOSIS OF LIVER WITH ASCITES, UNSPECIFIED HEPATIC CIRRHOSIS TYPE (HCC): Primary | ICD-10-CM

## 2020-10-28 DIAGNOSIS — K74.60 CIRRHOSIS OF LIVER WITH ASCITES, UNSPECIFIED HEPATIC CIRRHOSIS TYPE (HCC): Primary | ICD-10-CM

## 2020-10-28 DIAGNOSIS — Z01.818 PRE-OP TESTING: Primary | ICD-10-CM

## 2020-10-28 DIAGNOSIS — I85.00 ESOPHAGEAL VARICES WITHOUT BLEEDING, UNSPECIFIED ESOPHAGEAL VARICES TYPE (HCC): ICD-10-CM

## 2020-10-28 DIAGNOSIS — K74.60 HEPATIC CIRRHOSIS, UNSPECIFIED HEPATIC CIRRHOSIS TYPE, UNSPECIFIED WHETHER ASCITES PRESENT (HCC): ICD-10-CM

## 2020-10-28 PROCEDURE — 0DJ08ZZ INSPECTION OF UPPER INTESTINAL TRACT, VIA NATURAL OR ARTIFICIAL OPENING ENDOSCOPIC: ICD-10-PCS | Performed by: INTERNAL MEDICINE

## 2020-10-28 PROCEDURE — 43235 EGD DIAGNOSTIC BRUSH WASH: CPT | Performed by: INTERNAL MEDICINE

## 2020-10-28 RX ORDER — NALOXONE HYDROCHLORIDE 0.4 MG/ML
80 INJECTION, SOLUTION INTRAMUSCULAR; INTRAVENOUS; SUBCUTANEOUS AS NEEDED
Status: DISCONTINUED | OUTPATIENT
Start: 2020-10-28 | End: 2020-10-28

## 2020-10-28 RX ORDER — SODIUM CHLORIDE, SODIUM LACTATE, POTASSIUM CHLORIDE, CALCIUM CHLORIDE 600; 310; 30; 20 MG/100ML; MG/100ML; MG/100ML; MG/100ML
INJECTION, SOLUTION INTRAVENOUS CONTINUOUS
Status: DISCONTINUED | OUTPATIENT
Start: 2020-10-28 | End: 2020-10-28

## 2020-10-28 RX ORDER — LIDOCAINE HYDROCHLORIDE 10 MG/ML
INJECTION, SOLUTION EPIDURAL; INFILTRATION; INTRACAUDAL; PERINEURAL AS NEEDED
Status: DISCONTINUED | OUTPATIENT
Start: 2020-10-28 | End: 2020-10-28 | Stop reason: SURG

## 2020-10-28 RX ADMIN — SODIUM CHLORIDE, SODIUM LACTATE, POTASSIUM CHLORIDE, CALCIUM CHLORIDE: 600; 310; 30; 20 INJECTION, SOLUTION INTRAVENOUS at 11:52:00

## 2020-10-28 RX ADMIN — LIDOCAINE HYDROCHLORIDE 50 MG: 10 INJECTION, SOLUTION EPIDURAL; INFILTRATION; INTRACAUDAL; PERINEURAL at 11:52:00

## 2020-10-28 RX ADMIN — SODIUM CHLORIDE, SODIUM LACTATE, POTASSIUM CHLORIDE, CALCIUM CHLORIDE: 600; 310; 30; 20 INJECTION, SOLUTION INTRAVENOUS at 11:57:00

## 2020-10-28 NOTE — OPERATIVE REPORT
ESOPHAGOGASTRODUODENOSCOPY (EGD) REPORT    Ismael Mari     1945 Age 67year old   PCP No primary care provider on file.  Endoscopist Kelvin Levin MD     Date of procedure: 10/28/20      Procedure: EGD     Pre-operative diagnosis: Ash Harrison from duodenum). Impression:  1. Small hiatal hernia, otherwise normal. No esophageal or gastric varices noted. 2. Normal appearing stomach and duodenum. Recommend:  1. Continue to work on alcohol cessation.   2. Follow-up with hepatology clinic as in

## 2020-10-28 NOTE — ANESTHESIA PREPROCEDURE EVALUATION
Anesthesia PreOp Note    HPI:     Jacinda Cai is a 67year old male who presents for preoperative consultation requested by: Leonora Gates MD    Date of Surgery: 10/28/2020    Procedure(s):  ESOPHAGOGASTRODUODENOSCOPY (EGD)  Indication: Hepatic cir right         Date Noted: 10/18/2017      Impacted cerumen of right ear         Date Noted: 10/18/2017      Cold         Date Noted: 10/18/2017      Physical exam, annual         Date Noted: 09/23/2016      Unstable gait         Date Noted: 09/23/2016 History:   Procedure Laterality Date   • CABG  07/2011   • COLONOSCOPY     • ESOPHAGOGASTRODUODENOSCOPY (EGD) N/A 10/17/2018    Performed by Sergei Rivera MD at [place] [address]      ventral hernia repair   • KNEE REPLACEMENT SURGERY Rig Cancer Father [de-identified]        prostate (cause of death)   • Diabetes Father    • Heart Disorder Mother         cva   • Stroke Mother 67        cause of death   • Cancer Mother 76        cancer involving the bone   • Heart Disorder Sister         CAD (cause of de Not Asked        Caffeine Concern: Yes          coffee        Occupational Exposure: Not Asked        Hobby Hazards: Not Asked        Sleep Concern: Not Asked        Stress Concern: Not Asked        Weight Concern: Not Asked        Special Diet: Not Asked regional wall motion abnormalities. 2. Aortic root: The aortic root is mildly dilated. 3.9 cm   3. Right ventricle: The cavity size is increased.  Wall thickness is normal.      Estimation of the right ventricular systolic pressure is moderately      incr

## 2020-10-28 NOTE — ANESTHESIA POSTPROCEDURE EVALUATION
Patient: Fredy Hickman    Procedure Summary     Date: 10/28/20 Room / Location: [facility] ENDOSCOPY 01 / [facility] ENDOSCOPY    Anesthesia Start: 6712 Anesthesia Stop: 0541    Procedure: ESOPHAGOGASTRODUODENOSCOPY (EGD) (N/A ) Diagnosis:       Hepatic cirrhosis, un

## 2020-10-30 ENCOUNTER — OFFICE VISIT (OUTPATIENT)
Dept: HEMATOLOGY/ONCOLOGY | Facility: HOSPITAL | Age: 75
End: 2020-10-30
Attending: INTERNAL MEDICINE
Payer: MEDICARE

## 2020-10-30 VITALS
BODY MASS INDEX: 25.62 KG/M2 | HEART RATE: 57 BPM | HEIGHT: 71 IN | DIASTOLIC BLOOD PRESSURE: 61 MMHG | WEIGHT: 183 LBS | TEMPERATURE: 97 F | RESPIRATION RATE: 16 BRPM | SYSTOLIC BLOOD PRESSURE: 114 MMHG

## 2020-10-30 DIAGNOSIS — Z87.898 HISTORY OF LYMPHADENOPATHY: ICD-10-CM

## 2020-10-30 DIAGNOSIS — Z85.6 HISTORY OF ACUTE LYMPHOBLASTIC LEUKEMIA (ALL): Primary | ICD-10-CM

## 2020-10-30 DIAGNOSIS — K70.31 ALCOHOLIC CIRRHOSIS OF LIVER WITH ASCITES (HCC): ICD-10-CM

## 2020-10-30 PROCEDURE — 99214 OFFICE O/P EST MOD 30 MIN: CPT | Performed by: INTERNAL MEDICINE

## 2020-10-30 NOTE — PROGRESS NOTES
[facility] Progress Note    Patient Name: Jacinda Cai   YOB: 1945   Medical Record Number: [id number]   CSN: [id number]   Consulting Physician: Jaiden Frias MD  Referring Physician(s): No ref.  provider found  Date of Visit: 10/30/20 of increased liver enzymes concerning for alcohol-related disease. Roscoe assist dyspnea on exertion and fatigue as his main symptoms. He denies recurrent fevers or infections, night sweats, lymphadenopathy, reports weight is now stabilized.   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 He and his wife live in [place], [place]. Stephan Cousin enjoys reading, cooks occasionally, currently doing physical therapy for balance and strength.        Jodi male  Psych: Memory impairment present, but with appropriate questions and responses  HEENT: EOMs intact. Oropharynx is clear. Neck: No palpable lymphadenopathy. Neck is supple. Lymphatics:  There is no palpable lymphadenopathy throughout in the cervical small bowel loops into the inguinal canal is new. Bladder wall thickening likely secondary from an enlarged prostate. Hepatic steatosis.        Remote granulomatous disease    CT C/A/P 7/10/2020 at [address] [facility]:    The liver study available at this institution for comparison. If a previous outside study is available, it can be submitted, and an addendum report will be generated with comparison.  If there is no previous study available, a follow-up CT scan in 3 months could be p WITH DIFFERENTIAL WITH PLATELET, COMP         METABOLIC PANEL (14)    (K55.06) Alcoholic cirrhosis of liver with ascites (HCC)  Plan: CBC WITH DIFFERENTIAL WITH PLATELET, COMP         METABOLIC PANEL (15  67year old M with past medical history significan time    --Patient has evidence of bilateral pleural effusions left greater than right recommended diagnostic thoracentesis; no malignant cells identified, again consistent with cirrhosis likely     --he was recommended to RTC in about 3 months with repeat with Dr. Antonette Wells in hepatology, discussed following with liver U/S every 6 months for possible hepatocellular cancer development          Camacho Little MD  [facility] [address]  [address] [place] [address] [address]

## 2020-11-18 ENCOUNTER — OFFICE VISIT (OUTPATIENT)
Dept: AUDIOLOGY | Facility: CLINIC | Age: 75
End: 2020-11-18
Payer: MEDICARE

## 2020-11-18 ENCOUNTER — OFFICE VISIT (OUTPATIENT)
Dept: OTOLARYNGOLOGY | Facility: CLINIC | Age: 75
End: 2020-11-18
Payer: MEDICARE

## 2020-11-18 VITALS
BODY MASS INDEX: 24.5 KG/M2 | DIASTOLIC BLOOD PRESSURE: 72 MMHG | TEMPERATURE: 97 F | WEIGHT: 175 LBS | HEIGHT: 71 IN | SYSTOLIC BLOOD PRESSURE: 130 MMHG

## 2020-11-18 DIAGNOSIS — H90.3 SENSORINEURAL HEARING LOSS, BILATERAL: Primary | ICD-10-CM

## 2020-11-18 DIAGNOSIS — H61.23 BILATERAL IMPACTED CERUMEN: Primary | ICD-10-CM

## 2020-11-18 PROCEDURE — 92593 HEARING AID CHECK, BOTH EARS: CPT | Performed by: AUDIOLOGIST

## 2020-11-18 PROCEDURE — 69210 REMOVE IMPACTED EAR WAX UNI: CPT | Performed by: OTOLARYNGOLOGY

## 2020-11-19 NOTE — PROGRESS NOTES
HEARING AID FOLLOW-UP    Ebonie Merino  7/31/1945  [id number]    Patient is here for annual hearing aid check. The patient has the following concerns:   Feels like not hearing as well from right side for several month.      In office the following a

## 2020-12-08 ENCOUNTER — TELEPHONE (OUTPATIENT)
Dept: GASTROENTEROLOGY | Facility: CLINIC | Age: 75
End: 2020-12-08

## 2020-12-08 NOTE — TELEPHONE ENCOUNTER
Overdue reminder letter mailed out to patient.     Labs:   AFP, TUMOR MARKER, SERUM   HEPATIC FUNCTION PANEL (7)   PROTHROMBIN TIME (PT)

## 2020-12-14 NOTE — PROGRESS NOTES
Before the initiation of today's documented service, the patient or patient guardian verbally consented to virtual/remote treatment.  I spent the following number of minutes in direct telephone communication with the patient or patient guardian: Nehal Alcantar gout of right hand    • Basal cell carcinoma 2010    ear surgery   • Depression    • Disorder of liver     Cirrhosis   • Elevated PSA 2008    prostate biopsies, bx neg for cancer   • Epistaxis 2010    packing    • ETOH abuse 2011   • Gout 2013    meds in r mg total) by mouth daily. 90 tablet 3   • allopurinol 100 MG Oral Tab Take 100 mg by mouth daily. • furosemide (LASIX) 40 MG Oral Tab Take 1 tablet (40 mg total) by mouth every evening.   0   • PANTOPRAZOLE SODIUM 40 MG Oral Tab EC TAKE 1 TABLET(40 MG) he usually takes. Previously discussed the similarities in the medications and preference of one or the other but he continues to do well with this regimen and labs (8/19/20) relatively OK.   - Current diuretics - Torsemide 40 mg AM, 20 mg PM + Furosemide 4

## 2021-02-02 DIAGNOSIS — Z23 NEED FOR VACCINATION: ICD-10-CM

## 2021-02-05 ENCOUNTER — IMMUNIZATION (OUTPATIENT)
Dept: LAB | Age: 76
End: 2021-02-05
Attending: HOSPITALIST
Payer: MEDICARE

## 2021-02-05 DIAGNOSIS — Z23 NEED FOR VACCINATION: Primary | ICD-10-CM

## 2021-02-05 PROCEDURE — 0001A SARSCOV2 VAC 30MCG/0.3ML IM: CPT

## 2021-03-01 ENCOUNTER — IMMUNIZATION (OUTPATIENT)
Dept: LAB | Age: 76
End: 2021-03-01
Attending: HOSPITALIST
Payer: MEDICARE

## 2021-03-01 DIAGNOSIS — Z23 NEED FOR VACCINATION: Primary | ICD-10-CM

## 2021-03-01 PROCEDURE — 0002A SARSCOV2 VAC 30MCG/0.3ML IM: CPT

## 2021-04-30 ENCOUNTER — APPOINTMENT (OUTPATIENT)
Dept: HEMATOLOGY/ONCOLOGY | Facility: HOSPITAL | Age: 76
End: 2021-04-30
Attending: INTERNAL MEDICINE
Payer: MEDICARE

## 2021-05-04 ENCOUNTER — LABORATORY ENCOUNTER (OUTPATIENT)
Dept: LAB | Age: 76
End: 2021-05-04
Attending: INTERNAL MEDICINE
Payer: MEDICARE

## 2021-05-04 DIAGNOSIS — K70.31 ALCOHOLIC CIRRHOSIS OF LIVER WITH ASCITES (HCC): ICD-10-CM

## 2021-05-04 DIAGNOSIS — Z87.898 HISTORY OF LYMPHADENOPATHY: ICD-10-CM

## 2021-05-04 PROCEDURE — 85025 COMPLETE CBC W/AUTO DIFF WBC: CPT

## 2021-05-04 PROCEDURE — 36415 COLL VENOUS BLD VENIPUNCTURE: CPT

## 2021-05-04 PROCEDURE — 80053 COMPREHEN METABOLIC PANEL: CPT

## 2021-05-12 ENCOUNTER — OFFICE VISIT (OUTPATIENT)
Dept: HEMATOLOGY/ONCOLOGY | Facility: HOSPITAL | Age: 76
End: 2021-05-12
Attending: INTERNAL MEDICINE
Payer: MEDICARE

## 2021-05-12 VITALS
BODY MASS INDEX: 25.62 KG/M2 | HEIGHT: 71 IN | TEMPERATURE: 98 F | DIASTOLIC BLOOD PRESSURE: 65 MMHG | WEIGHT: 183 LBS | HEART RATE: 55 BPM | OXYGEN SATURATION: 97 % | RESPIRATION RATE: 18 BRPM | SYSTOLIC BLOOD PRESSURE: 116 MMHG

## 2021-05-12 DIAGNOSIS — K70.31 ALCOHOLIC CIRRHOSIS OF LIVER WITH ASCITES (HCC): ICD-10-CM

## 2021-05-12 DIAGNOSIS — Z87.898 HISTORY OF LYMPHADENOPATHY: Primary | ICD-10-CM

## 2021-05-12 PROCEDURE — 99214 OFFICE O/P EST MOD 30 MIN: CPT | Performed by: INTERNAL MEDICINE

## 2021-05-12 NOTE — PROGRESS NOTES
Cancer Center Progress Note    Patient Name: Amy Fuentes   YOB: 1945   Medical Record Number: [id number]   CSN: [id number]   Consulting Physician: Magy Pérez MD  Referring Physician(s): None Pcp  Date of Visit: 5/12/2021    Chief Com liver enzymes concerning for alcohol-related disease. Roscoe assist dyspnea on exertion and fatigue as his main symptoms. He denies recurrent fevers or infections, night sweats, lymphadenopathy, reports weight is now stabilized.   Nani Loredo is having some balance which is in the lower center of his abdomen associated with transient distention. Otherwise, he denies any new complaints or concerns on review of systems today.     Past Medical History:  Past Medical History:   Diagnosis Date   • Acute idiopathic gout of file      Number of children: 2      Years of education: Not on file      Highest education level: Not on file    Occupational History      Occupation:         Comment: retired    Tobacco Use      Smoking status: Never Smoker      Smoke Frequency of Social Gatherings with Friends and Family:       Attends Buddhist Services:       Active Member of Clubs or Organizations:       Attends Club or Organization Meetings:       Marital Status:   Intimate Partner Violence:       Fear of Current o exertion. Gastrointestinal: Negative for dysphagia, odynophagia, reflux symptoms, nausea, vomiting, change in bowel habits, diarrhea, constipation and abdominal pain.  No abdominal distention  Integument/breast: Negative for rash, skin lesions, and pruritu MCHC 32.3 05/04/2021 02:02 PM    RDW 14.5 05/04/2021 02:02 PM    NEPRELIM 3.55 05/04/2021 02:02 PM    .0 (L) 05/04/2021 02:02 PM       Lab Results   Component Value Date/Time    GLU 90 05/04/2021 02:02 PM    BUN 17 05/04/2021 02:02 PM    FAUSTINO from his prior cancer treatment or possible recurrence/new development of another malignant process; however, his lymph node biopsy appears most consistent with reactive lyphadenopathy    --Patient had some noted anemia and thrombocytopenia which may be re RTC for a virtual follow up next week prior to his visit with his hepatologist to review his results  --uncertain if he can have an MRI abdomen image as he mentions his right knee sets of [place] metal detector at the airport    2.) Memory impairment/balance i

## 2021-05-13 ENCOUNTER — HOSPITAL ENCOUNTER (OUTPATIENT)
Dept: CT IMAGING | Facility: HOSPITAL | Age: 76
Discharge: HOME | End: 2021-05-13
Attending: INTERNAL MEDICINE
Payer: MEDICARE

## 2021-05-13 DIAGNOSIS — Z87.898 HISTORY OF LYMPHADENOPATHY: ICD-10-CM

## 2021-05-13 DIAGNOSIS — K70.31 ALCOHOLIC CIRRHOSIS OF LIVER WITH ASCITES (HCC): ICD-10-CM

## 2021-05-13 PROCEDURE — 74177 CT ABD & PELVIS W/CONTRAST: CPT | Performed by: INTERNAL MEDICINE

## 2021-05-19 ENCOUNTER — VIRTUAL PHONE E/M (OUTPATIENT)
Dept: HEMATOLOGY/ONCOLOGY | Facility: HOSPITAL | Age: 76
End: 2021-05-19
Attending: INTERNAL MEDICINE
Payer: MEDICARE

## 2021-05-19 DIAGNOSIS — R59.0 ABDOMINAL LYMPHADENOPATHY: ICD-10-CM

## 2021-05-19 DIAGNOSIS — K70.30 ALCOHOLIC CIRRHOSIS OF LIVER WITHOUT ASCITES (HCC): ICD-10-CM

## 2021-05-19 PROCEDURE — 99442 PHONE E/M BY PHYS 11-20 MIN: CPT | Performed by: INTERNAL MEDICINE

## 2021-05-19 NOTE — PROGRESS NOTES
Before the initiation of today's documented service, the patient or patient guardian verbally consented to virtual/remote treatment.  I spent the following number of minutes in direct telephone communication with the patient or patient guardian: 12.    This 5/4/21 stable       Past Medical History:   Diagnosis Date   • Acute idiopathic gout of right hand    • Basal cell carcinoma 2010    ear surgery   • Depression    • Disorder of liver     Cirrhosis   • Elevated PSA 2008    prostate biopsies, bx neg for canc mouth every evening. 90 tablet 3   • torsemide 20 MG Oral Tab 2 tab in the AM and 1 Tab in the  tablet 3   • eplerenone 50 MG Oral Tab Take 1 tablet (50 mg total) by mouth daily. 90 tablet 3   • allopurinol 100 MG Oral Tab Take 100 mg by mouth daily. but he continues to do well with this regimen and labs (5/4/21) stable.   - Current diuretics - Torsemide 40 mg AM, 20 mg PM + Furosemide 40 mg PM + eplerenone 50 mg daily.     # Alcohol cirrhosis  - Still drinking, currently has ~ 2.5 glasses of wine and 1

## 2021-05-24 NOTE — PROGRESS NOTES
Virtual/Telephone Check-In    Ismael Mari verbally consents to a Virtual/Telephone Check-In service on 05/19/21. Patient understands and accepts financial responsibility for any deductible, co-insurance and/or co-pays associated with this service.   Alli Armstrong mouth daily. , Disp: 90 tablet, Rfl: 3  •  furosemide (LASIX) 40 MG Oral Tab, Take 1 tablet (40 mg total) by mouth every evening., Disp: 90 tablet, Rfl: 3  •  torsemide 20 MG Oral Tab, 2 tab in the AM and 1 Tab in the PM, Disp: 260 tablet, Rfl: 3  •  eplere basal cell carcinoma removal from nose and ear      Family History   Problem Relation Age of Onset   • Cancer Father [de-identified]        prostate (cause of death)   • Diabetes Father    • Heart Disorder Mother         cva   • Stroke Mother 67        cause of de radiation in 1983 that he might have a complication from his prior cancer treatment or possible recurrence/new development of another malignant process; however, his lymph node biopsy appears most consistent with reactive lyphadenopathy    --Patient had so exam     --I have recommended repeat CT abdomen pelvis that was completed on 5/13/2021    --today, I have reviewed those results with him and his wife over the phone and notified his hepatologist to review his results as well    --Discussed with the patien monitor    5.) Thrombocytopenia    --also mild in pt with cirrhosis, suspect this may be a feature of splenomegaly in a patient with cirrhosis repeat CT abdomen ordered per above does not show signs of splenomegaly    Data:    CT abdomen+pelvis 5/13/2021 Oncology Group  [address]   [address] [facility]

## 2021-09-16 NOTE — PROGRESS NOTES
[facility] Progress Note    Patient Name: Liliam Sanabria   YOB: 1945   Medical Record Number: [id number]   CSN: [id number]   Consulting Physician: Angelica Mcdowell MD  Referring Physician(s): Zelda Barr  Date of Visit: 7/23/2018 increased liver enzymes concerning for alcohol-related disease. Roscoe assist dyspnea on exertion and fatigue as his main symptoms. He denies recurrent fevers or infections, night sweats, lymphadenopathy, reports weight is now stabilized.   Elise Hardy is having so essential hypertension     drug therapy       Past Surgical History:  Past Surgical History:  07/2011: CABG  No date: HERNIA SURGERY      Comment: ventral hernia repair  2018: KNEE REPLACEMENT SURGERY Right  No date: SKIN SURGERY      Comment: basal cell c EC Take 1 tablet (40 mg total) by mouth once daily. Disp: 90 tablet Rfl: 1   lisinopril 10 MG Oral Tab Take 1 tablet (10 mg total) by mouth once daily. Disp: 90 tablet Rfl: 3   folic acid 1 MG Oral Tab Take 1 tablet (1 mg total) by mouth daily.  Disp: 90 ta 24.27 kg/m²     Physical Examination:    General: Patient is alert and oriented x 3, not in acute distress. Thin framed male  Psych: Memory impairment present, but with appropriate questions and responses  HEENT: EOMs intact. Oropharynx is clear.    Neck: N [Fever] : no fever [Chills] : no chills subcentimeter short axis mediastinal lymph nodes are present. A these are similar in appearance to outside comparison study from   4/2018. AORTA:             Atherosclerotic calcifications. No aneurysm. CHEST WALL:  Gynecomastia.  No significant axillary [Hot Flashes] : no hot flashes bladder base. BONES:             Multilevel degenerative changes of the visualized spine. No acute-appearing fracture or suspicious osseous lesion. OTHER:             Negative.     =====  CONCLUSION:      1.  Worsening left greater than right pleural ef Clinical correlation is recommended.          Impression:    (Z85.6) History of acute lymphoblastic leukemia (ALL)  (primary encounter diagnosis)  Plan: CBC WITH DIFFERENTIAL WITH PLATELET, COMP         METABOLIC PANEL (14), LDH, URIC ACID, SERUM    (Z72.89 [Night Sweats] : no night sweats [Suicidal] : not suicidal presence of cirrhosis, splenomegaly which is likely contributing to his thrombocytopenia and anemia    --Retroperitoneal lymphadenopathy similar in size to April imaging which occurred in [place], which is slightly enlarged.  Lab data do not suggest recur [Insomnia] : no insomnia [Anxiety] : no anxiety [Depression] : no depression [FreeTextEntry2] : hyperhidrosis  [de-identified] : Irritable mood

## 2021-11-05 ENCOUNTER — HOSPITAL ENCOUNTER (OUTPATIENT)
Dept: ULTRASOUND IMAGING | Age: 76
Discharge: HOME | End: 2021-11-05
Attending: INTERNAL MEDICINE
Payer: MEDICARE

## 2021-11-05 DIAGNOSIS — K70.30 ALCOHOLIC CIRRHOSIS OF LIVER WITHOUT ASCITES (HCC): ICD-10-CM

## 2021-11-05 PROCEDURE — 76705 ECHO EXAM OF ABDOMEN: CPT | Performed by: INTERNAL MEDICINE

## 2021-12-12 NOTE — PROGRESS NOTES
Before the initiation of today's documented service, the patient or patient guardian verbally consented to virtual/remote treatment. I spent the following number of minutes in direct telephone communication with the patient or patient guardian: 8 .     This hand    • Basal cell carcinoma 2010    ear surgery   • Depression    • Disorder of liver     Cirrhosis   • Elevated PSA 2008    prostate biopsies, bx neg for cancer   • Epistaxis 2010    packing    • ETOH abuse 2011   • Gout 2013    meds in rehab   • H/O m tablet 3   • eplerenone 50 MG Oral Tab Take 1 tablet (50 mg total) by mouth daily. 90 tablet 3   • allopurinol 100 MG Oral Tab Take 100 mg by mouth daily.      • PANTOPRAZOLE SODIUM 40 MG Oral Tab EC TAKE 1 TABLET(40 MG) BY MOUTH EVERY DAY 90 tablet 1   • m varices - EGD 10/28/20 with no EoV  - No encephalopathy; recommend resuming lactulose which has helped  with constipation  - HCC screening: Ultrasound Nov '21 with no lesion; repeat May '22  - Immune to hepatitis A; susceptible to hepatitis B recommend get

## 2022-08-17 ENCOUNTER — OFFICE VISIT (OUTPATIENT)
Dept: AUDIOLOGY | Facility: CLINIC | Age: 77
End: 2022-08-17
Payer: MEDICARE

## 2022-08-17 ENCOUNTER — TELEPHONE (OUTPATIENT)
Dept: SURGERY | Facility: CLINIC | Age: 77
End: 2022-08-17

## 2022-08-17 DIAGNOSIS — H90.3 SENSORINEURAL HEARING LOSS, BILATERAL: Primary | ICD-10-CM

## 2022-08-17 PROCEDURE — 92593 HEARING AID CHECK, BOTH EARS: CPT | Performed by: AUDIOLOGIST

## 2022-08-17 NOTE — TELEPHONE ENCOUNTER
Returned call from patient's daughter Ana Paula Szymanski who asked about follow-up appt and needed orders. Patient was last seen Dec '21. Communicated changes in the liver clinic staffing and follow-up options. Discussed that patient is past due for follow-up with hepatologist and that imaging and lab orders placed during visit in December remain active. Provided number to call back if she has additional questions and to leave a time when she could be reached if we miss each other.     ANGEL Whiteside  Nurse Practitioner, Hepatology  [phone] (office)

## 2022-08-18 NOTE — PROGRESS NOTES
HEARING AID FOLLOW-UP    Yonis Rogers  7/31/1945  [id number]    Patient is here for an annual hearing aid check. Hearing Aid Information:   Suzanne Ache [id number]  Right #[id number]  Left #[id number]  Date Dispensed:  Elsewhere in 2015? Coupled to custom c-shells  Wax Guard: Cerustop  Battery #[id number]    The patient has the following concerns:   Left hearing aid wire from c-shell is nearly broken. Patient's niece (an audiologist) taped it recently. Still works. In office the following actions were taken:    Otoscopic shows lots of dry/flaky cerumen bilaterally. TMs not visible at all. Cleaned aids and checked, but did not take off tape on left aid. Listening check is okay. Due to age of hearing aids (8years old) discussed considering new hearing aids. Also discussed option of ordering new c-shell for left one with broken wire ($125), but cautioned that this does not warrant hearing aid part at all and that if hearing aid malfunctions, I would not advise repair due to age of aids. However, this would allow him to have a working set which would then become his back-ups if he pursues new hearing aids. Tympanograms were normal bilaterally, so proceeded with audiogram which is essentially stable from previous testing. Discussed new technology and features and prices. Patient's choice:    Suzanne Ache  (wants to stay with batteries, not rechargeable)  Color P1 (Sand beige)  Custom c-shells. (Would want to take new impressions, especially as aids have fallen out a few times. Needs an ear cleaning prior to taking impressions). Will also order new replacement c-shell for left ear for current hearing aids. When c-shell comes in, will need to call for short appt to attach to hearing aid. (Patient has appt 8-30 with Dr. Rodolfo Akers for ear cleaning).        8/18/2022  Angelica Gonzalez

## 2022-08-30 ENCOUNTER — OFFICE VISIT (OUTPATIENT)
Dept: AUDIOLOGY | Facility: CLINIC | Age: 77
End: 2022-08-30
Payer: MEDICARE

## 2022-08-30 ENCOUNTER — OFFICE VISIT (OUTPATIENT)
Dept: OTOLARYNGOLOGY | Facility: CLINIC | Age: 77
End: 2022-08-30
Payer: MEDICARE

## 2022-08-30 VITALS — BODY MASS INDEX: 25.62 KG/M2 | WEIGHT: 183 LBS | HEIGHT: 71 IN

## 2022-08-30 DIAGNOSIS — H61.23 BILATERAL IMPACTED CERUMEN: Primary | ICD-10-CM

## 2022-08-30 DIAGNOSIS — H90.3 SENSORINEURAL HEARING LOSS, BILATERAL: Primary | ICD-10-CM

## 2022-08-30 PROCEDURE — 69210 REMOVE IMPACTED EAR WAX UNI: CPT | Performed by: OTOLARYNGOLOGY

## 2022-08-30 PROCEDURE — V5265 EAR MOLD/INSERT, DISP: HCPCS | Performed by: AUDIOLOGIST

## 2022-08-31 ENCOUNTER — AUDIOLOGY DOCUMENTATION (OUTPATIENT)
Dept: AUDIOLOGY | Facility: CLINIC | Age: 77
End: 2022-08-31

## 2022-08-31 NOTE — PROGRESS NOTES
Patient had earmold impressions taken yesterday after ear cleaning. Will go ahead and order hearing aids as we had discussed:     Clarissa Tyler  Option 2. Color: P1 Sand beige  #2 wire with custom c-shells    Charges entered today for c-shell that was dispensed yesterday for his current Audeo V aid on left #[id number].  $125. Patient will be contacted when aid is here for scheduling.

## 2022-08-31 NOTE — PROGRESS NOTES
Earmolds    Patient was here following appointment with Dr. Manuela Cole for ear cleaning    aMrilia Pritchard was fitted for new earmold(s) for the both ears.     Impressions were taken without incident  Dr. Kirk Garcia will contact patient to finalize order for new hearing aids     Impressions on counter in audio room    8/31/2022  Tawana Claude, Au.D

## 2022-09-23 ENCOUNTER — OFFICE VISIT (OUTPATIENT)
Dept: AUDIOLOGY | Facility: CLINIC | Age: 77
End: 2022-09-23

## 2022-09-23 DIAGNOSIS — H90.3 SENSORINEURAL HEARING LOSS, BILATERAL: Primary | ICD-10-CM

## 2022-09-23 PROCEDURE — V5261 HEARING AID, DIGIT, BIN, BTE: HCPCS | Performed by: AUDIOLOGIST

## 2022-09-23 NOTE — PROGRESS NOTES
Hearing Aid Fitting    Andrew Martino purchased two hearing aids. The hearing aid fitting was completed by Angelica Hurt. Hearing Aid Information  Denver Burch   Right #[id number]  Left #[id number]  Coupled to custom c-shells  Dispensing date:  9-23-22  Battery: 312  Wax Guard:  Cerustop    Patient is experienced hearing aid user. Completed orientation and REM. Patient opted not to pair aids to his phone at this time, but may consider this at next visit. Uses phone primarily for phone calls. Does not really consider himself tech-savvy. Showed how to adjust volume and that volume adjustments are synced between ears. The following items were demonstrated to the patient:  Insertion/removal of hearing aid into ear  Insertion/removal of battery  Adjustment of volume  Changing programs  Function and operation of controls  Telephone use  Cleaning of hearing aid/earmold  Storage of hearing aids  Use of accessories  Remote control operation    The patient was informed of or received the following:  Adjustment period and realistic expectations  Battery size  Battery ingestion warning  Cleaning tools  Hearing aid instruction book   Carrying case  Purchase agreement  Repair/loss coverage  Trial period/return policy  Service period  Medical clearance    Patient expressed understanding to all discussed topics. Follow-up scheduled for 4 weeks.   Encouraged to call or send message if any problems prior to follow up visit.       09/23/22  Angelica Hurt

## 2022-11-02 ENCOUNTER — OFFICE VISIT (OUTPATIENT)
Dept: AUDIOLOGY | Facility: CLINIC | Age: 77
End: 2022-11-02
Payer: MEDICARE

## 2022-11-02 DIAGNOSIS — H90.3 SENSORINEURAL HEARING LOSS, BILATERAL: Primary | ICD-10-CM

## 2022-11-02 PROCEDURE — 92593 HEARING AID CHECK, BOTH EARS: CPT | Performed by: AUDIOLOGIST

## 2023-09-20 ENCOUNTER — OFFICE VISIT (OUTPATIENT)
Dept: AUDIOLOGY | Facility: CLINIC | Age: 78
End: 2023-09-20

## 2023-09-20 DIAGNOSIS — H90.3 SENSORINEURAL HEARING LOSS, BILATERAL: Primary | ICD-10-CM

## 2023-09-20 PROCEDURE — 92593 HEARING AID CHECK, BOTH EARS: CPT | Performed by: AUDIOLOGIST

## 2023-09-20 NOTE — PROGRESS NOTES
HEARING AID FOLLOW-UP    Brain Avers  7/31/1945  [id number]    Patient is here for hearing aid check. (Patient arrived 30 minutes late for his appt. Completed as much as possible in remaining time allotted.)    Hearing Aid Information  Raleigh Pabon   Right #[id number]  Left #[id number]  Coupled to custom c-shells  Dispensing date:  9-23-22  Battery: 312  Wax Guard:  Cerustop    The patient has the following concerns:   Wife feels he's not hearing as well. Patient also feels he's not doing as well as he should. In office the following actions were taken:  Otoscopic shows ears are relatively clear, not occluded. Listening check is good. Wax guards were missing but replaced in office. No obvious problem noted with heaing aids. Recheck of hearing shows some small change (decrease) in hearing thresholds. Word recognition is poor for right ear and fair for left ear. Made programming adjustments for new audiogram and increased overall gain by 2dB for both aids. Also patient would like to have hearing aids paired to his phone but he does not have with him today. He is also interested in obtaining TV connector. Will order for him and scheduled for Oct 6th at 2pm for  of TV connector and possbile help to pair aids to phone.          9/20/2023  Angelica Greene

## 2023-10-06 ENCOUNTER — OFFICE VISIT (OUTPATIENT)
Dept: AUDIOLOGY | Facility: CLINIC | Age: 78
End: 2023-10-06

## 2023-10-06 DIAGNOSIS — H90.3 SENSORINEURAL HEARING LOSS, BILATERAL: Primary | ICD-10-CM

## 2023-10-06 PROCEDURE — 92593 HEARING AID CHECK, BOTH EARS: CPT | Performed by: AUDIOLOGIST

## 2023-10-06 NOTE — PROGRESS NOTES
HEARING AID FOLLOW-UP    Gregoria Chaney  7/31/1945  [id number]    Patient is here for TV connector and pairing phone to hearing aids. The patient has the following concerns:   Patient is here with wife. She has old comPilot and docking station. She is concerned that they don't have correct toslink optical connection on back of TV to work with TV station. Also states she has so many wires behind TV that she is concerned about making changes there. In office the following actions were taken:  Demo'd TV connector in office with good results. Paired aids to phone and demonstrated phone calls. Opted not to proceed with TV connector. Patient is to follow up as needed.      10/6/2023  Angelica Chau

## 2023-11-14 ENCOUNTER — APPOINTMENT (OUTPATIENT)
Dept: GENERAL RADIOLOGY | Age: 78
End: 2023-11-14
Attending: NURSE PRACTITIONER
Payer: MEDICARE

## 2023-11-14 ENCOUNTER — HOSPITAL ENCOUNTER (OUTPATIENT)
Age: 78
Discharge: HOME | End: 2023-11-14
Payer: MEDICARE

## 2023-11-14 VITALS
SYSTOLIC BLOOD PRESSURE: 110 MMHG | OXYGEN SATURATION: 99 % | HEART RATE: 109 BPM | RESPIRATION RATE: 20 BRPM | DIASTOLIC BLOOD PRESSURE: 72 MMHG | TEMPERATURE: 98 F
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
## 2023-11-14 DIAGNOSIS — M25.552 LEFT HIP PAIN: Primary | ICD-10-CM

## 2023-11-14 PROCEDURE — 73502 X-RAY EXAM HIP UNI 2-3 VIEWS: CPT | Performed by: NURSE PRACTITIONER

## 2023-11-14 PROCEDURE — 73610 X-RAY EXAM OF ANKLE: CPT | Performed by: NURSE PRACTITIONER

## 2023-11-14 PROCEDURE — 99203 OFFICE O/P NEW LOW 30 MIN: CPT | Performed by: NURSE PRACTITIONER

## 2023-11-14 RX ORDER — LIDOCAINE 4 G/G
2 PATCH TOPICAL EVERY 24 HOURS
Qty: 14 PATCH | Refills: 0 | Status: SHIPPED | OUTPATIENT
Start: 2023-11-14 | End: 2023-11-21

## 2023-11-14 NOTE — DISCHARGE INSTRUCTIONS
As we discussed it is important to follow with your primary care provider as sometimes small fractures of the hip are not readily identifiable and may require MRI imaging.

## 2023-11-14 NOTE — ED INITIAL ASSESSMENT (HPI)
Pt came in due to left hip pain. Pt sated he fell onto his left side 3 weeks ago and has been having left hip pain ever since. Pt ambulates with cane. Pt stated he has been taking PO OTC pain meds at home with slight relief. Pt has easy non labored respirations.

## 2024-02-14 ENCOUNTER — LAB ENCOUNTER (OUTPATIENT)
Dept: LAB | Facility: HOSPITAL | Age: 79
End: 2024-02-14
Attending: FAMILY MEDICINE
Payer: MEDICARE

## 2024-02-14 DIAGNOSIS — E87.6 HYPOKALEMIA: Primary | ICD-10-CM

## 2024-02-14 LAB
ANION GAP SERPL CALC-SCNC: 8 MMOL/L (ref 0–18)
BUN BLD-MCNC: 11 MG/DL (ref 9–23)
BUN/CREAT SERPL: 13.1 (ref 10–20)
CALCIUM BLD-MCNC: 9 MG/DL (ref 8.7–10.4)
CHLORIDE SERPL-SCNC: 93 MMOL/L (ref 98–112)
CO2 SERPL-SCNC: 31 MMOL/L (ref 21–32)
CREAT BLD-MCNC: 0.84 MG/DL
EGFRCR SERPLBLD CKD-EPI 2021: 89 ML/MIN/1.73M2 (ref 60–?)
FASTING STATUS PATIENT QL REPORTED: YES
GLUCOSE BLD-MCNC: 93 MG/DL (ref 70–99)
OSMOLALITY SERPL CALC.SUM OF ELEC: 273 MOSM/KG (ref 275–295)
POTASSIUM SERPL-SCNC: 3.6 MMOL/L (ref 3.5–5.1)
SODIUM SERPL-SCNC: 132 MMOL/L (ref 136–145)

## 2024-02-14 PROCEDURE — 80048 BASIC METABOLIC PNL TOTAL CA: CPT

## 2024-02-14 PROCEDURE — 36415 COLL VENOUS BLD VENIPUNCTURE: CPT

## 2024-03-04 ENCOUNTER — LAB ENCOUNTER (OUTPATIENT)
Dept: LAB | Age: 79
End: 2024-03-04
Attending: FAMILY MEDICINE
Payer: MEDICARE

## 2024-03-04 DIAGNOSIS — I48.0 PAROXYSMAL ATRIAL FIBRILLATION (HCC): Primary | ICD-10-CM

## 2024-03-04 LAB
ALBUMIN SERPL-MCNC: 3.8 G/DL (ref 3.2–4.8)
ALBUMIN/GLOB SERPL: 1.2 {RATIO} (ref 1–2)
ALP LIVER SERPL-CCNC: 230 U/L
ALT SERPL-CCNC: 48 U/L
ANION GAP SERPL CALC-SCNC: 8 MMOL/L (ref 0–18)
AST SERPL-CCNC: 84 U/L (ref ?–34)
BILIRUB SERPL-MCNC: 1.8 MG/DL (ref 0.2–1.1)
BUN BLD-MCNC: 10 MG/DL (ref 9–23)
BUN/CREAT SERPL: 13.2 (ref 10–20)
CALCIUM BLD-MCNC: 8.8 MG/DL (ref 8.7–10.4)
CHLORIDE SERPL-SCNC: 82 MMOL/L (ref 98–112)
CO2 SERPL-SCNC: 34 MMOL/L (ref 21–32)
CREAT BLD-MCNC: 0.76 MG/DL
EGFRCR SERPLBLD CKD-EPI 2021: 91 ML/MIN/1.73M2 (ref 60–?)
FASTING STATUS PATIENT QL REPORTED: YES
GLOBULIN PLAS-MCNC: 3.1 G/DL (ref 2.8–4.4)
GLUCOSE BLD-MCNC: 99 MG/DL (ref 70–99)
INR BLD: 1.99 (ref 0.8–1.2)
OSMOLALITY SERPL CALC.SUM OF ELEC: 257 MOSM/KG (ref 275–295)
POTASSIUM SERPL-SCNC: 2.6 MMOL/L (ref 3.5–5.1)
PROT SERPL-MCNC: 6.9 G/DL (ref 5.7–8.2)
PROTHROMBIN TIME: 23.8 SECONDS (ref 11.6–14.8)
SODIUM SERPL-SCNC: 124 MMOL/L (ref 136–145)

## 2024-03-04 PROCEDURE — 36415 COLL VENOUS BLD VENIPUNCTURE: CPT

## 2024-03-04 PROCEDURE — 85610 PROTHROMBIN TIME: CPT

## 2024-03-04 PROCEDURE — 80053 COMPREHEN METABOLIC PANEL: CPT

## 2024-05-13 ENCOUNTER — HOSPITAL ENCOUNTER (OUTPATIENT)
Age: 79
Discharge: HOME | End: 2024-05-13

## 2024-05-13 VITALS
HEART RATE: 80 BPM | OXYGEN SATURATION: 99 % | TEMPERATURE: 98 F | RESPIRATION RATE: 20 BRPM | DIASTOLIC BLOOD PRESSURE: 61 MMHG | SYSTOLIC BLOOD PRESSURE: 106 MMHG

## 2024-05-13 DIAGNOSIS — M54.50 ACUTE MIDLINE LOW BACK PAIN WITHOUT SCIATICA: Primary | ICD-10-CM

## 2024-05-13 LAB
BILIRUB UR QL STRIP: NEGATIVE
CLARITY UR: CLEAR
COLOR UR: YELLOW
GLUCOSE UR STRIP-MCNC: 250 MG/DL
HGB UR QL STRIP: NEGATIVE
KETONES UR STRIP-MCNC: NEGATIVE MG/DL
LEUKOCYTE ESTERASE UR QL STRIP: NEGATIVE
NITRITE UR QL STRIP: NEGATIVE
PH UR STRIP: 7.5 [PH]
PROT UR STRIP-MCNC: NEGATIVE MG/DL
SP GR UR STRIP: 1.01
UROBILINOGEN UR STRIP-ACNC: <2 MG/DL

## 2024-05-13 PROCEDURE — 99213 OFFICE O/P EST LOW 20 MIN: CPT | Performed by: NURSE PRACTITIONER

## 2024-05-13 PROCEDURE — 81002 URINALYSIS NONAUTO W/O SCOPE: CPT | Performed by: NURSE PRACTITIONER

## 2024-05-13 RX ORDER — APIXABAN 5 MG/1
5 TABLET, FILM COATED ORAL EVERY 12 HOURS
COMMUNITY

## 2024-05-13 RX ORDER — LIDOCAINE 50 MG/G
1 PATCH TOPICAL EVERY 24 HOURS
Qty: 15 PATCH | Refills: 0 | Status: SHIPPED | OUTPATIENT
Start: 2024-05-13

## 2024-05-13 NOTE — ED INITIAL ASSESSMENT (HPI)
Pt here with spouse with complaints of lower back pain , pt state he fell 1 month ago while in [place] and had xrays then which were negative, pt states he continues to have pain and is worse when standing and ambulating , pt denies any new injuries

## 2024-05-13 NOTE — ED PROVIDER NOTES
Patient Seen in: [facility]      History     Chief Complaint   Patient presents with    Back Pain     Stated Complaint: Back pain    Subjective:   78-year-old male with medical conditions as noted below presents with wife for complaints of continued low back pain.  States follow multiple call while in [place].  Had x-rays done at that time which were negative.  Continues to have pain and discussed with his PCP on 5/2/2024.  Was given Rx for Norco.  Wife states that he does take the Norco once a day and has not been complaining of pain as much as he was.  Pain is now a 2 out of 10.  Pain worse with standing.  No new injuries, extremity weakness/numbness/tingling, urinary symptoms. Patient endorses that he had a wet brief this morning upon waking at 10 am. Has not urinated since but only had some wine to drink. 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            Review of Systems   Constitutional:  Negative for chills and fever.   Cardiovascular:  Negative for chest pain.   Gastrointestinal:  Negative for abdominal pain, nausea and vomiting.   Genitourinary:  Negative for dysuria, frequency and urgency.   Musculoskeletal:  Positive for back pain.   All other systems reviewed and are negative.      Positive for stated complaint: Back pain  Other systems are as noted in HPI.  Constitutional and vital signs reviewed.      All other systems reviewed and negative except as noted above.    Physical Exam     ED Triage Vitals [05/13/24 1221]   /61   Pulse 80   Resp 20   Temp 97.6 °F (36.4 °C)   Temp src Temporal   SpO2 99 %   O2 Device None (Room air)       Current Vitals:   Vital Signs  BP: 106/61  Pulse: 80  Resp: 20  Temp: 97.6 °F (36.4 °C)  Temp src: Temporal    Oxygen Therapy  SpO2: 99 %  O2 Device: None (Room air)            Physical Exam  Vitals and nursing note reviewed.   Constitutional:       Appearance: Normal appearance.   Cardiovascular:      Rate and Rhythm: Normal rate and regular rhythm.   Pulmonary:      Effort: Pulmonary effort is normal.      Breath sounds: Normal breath sounds.   Musculoskeletal:         General: Normal range of motion.      Thoracic back: Normal.      Lumbar back: Bony tenderness present. No swelling or deformity. Normal range of motion.        Back:    Skin:     General: Skin is warm and dry.      Capillary Refill: Capillary refill takes less than 2 seconds.   Neurological:      Mental Status: He is alert and oriented to person, place, and time.   Psychiatric:         Behavior: Behavior is cooperative.               ED Course     Labs Reviewed   [facility] POCT URINALYSIS DIPSTICK - Abnormal; Notable for the following components:       Result Value    Glucose, Urine 250 (*)     All other components within normal limits                      MDM                                          Medical Decision Making  Patient is well-appearing.  I discussed differentials with patient including but not limited to lumbar fracture versus kidney stone versus lumbar strain  Wife states that she believes his pain has improved because he has not been taking as much pain medicine or moaning in pain  Lidocaine patch applied to area.  Patient states feels a little better  Patient given water. Able to urinate without pain or difficulty.  Small amount of glucose present in urine.  Negative for infection  Continue pain meds as previously prescribed  Rx lidocaine patches  Fu with PCP. Return/ ED precautions discussed      Problems Addressed:  Acute midline low back pain without sciatica: acute illness or injury    Amount and/or Complexity of Data Reviewed  Independent Historian: caregiver        Disposition and Plan     Clinical Impression:  1. Acute midline low back pain without sciatica         Disposition:  Discharge  5/13/2024  2:12 pm    Follow-up:  Shayan Wei MD  [address]  [address]  [facility] [address] [phone]    Schedule an appointment as soon as possible for a visit             Medications Prescribed:  Discharge Medication List as of 5/13/2024  2:14 PM

## 2025-02-06 ENCOUNTER — AUDIOLOGY DOCUMENTATION (OUTPATIENT)
Dept: AUDIOLOGY | Facility: CLINIC | Age: 80
End: 2025-02-06

## 2025-02-06 NOTE — PROGRESS NOTES
Hearing Aid Information  Vince Watson   Right #[id number]  Left #[id number]  Coupled to custom c-shells  Dispensing date:  9-23-22  Battery: 312  Wax Guard:  Cerustop    Patient dropped off both hearing aids for repair.   Cleaned and checked both aids.      Right aid: Dead even after cleaning  Left aid: Good amplification after cleaning. Wax filter and  plugged with wax.     Spoke with patient's wife and advised her we need to send in the right hearing aid for repair under warranty.   She will  the left aid at the  tomorrow.     Call patient when repaired aid arrives in office.     Angelica Mcdonough.

## 2025-02-21 ENCOUNTER — AUDIOLOGY DOCUMENTATION (OUTPATIENT)
Dept: AUDIOLOGY | Facility: CLINIC | Age: 80
End: 2025-02-21

## 2025-02-21 NOTE — PROGRESS NOTES
Hearing Aid Information  Vince Watson   Right #[id number]  Left #[id number]  Coupled to custom c-shells  Dispensing date:  9-23-22  Battery: 312  Wax Guard:  Helio    Patient's daughter is here to  repaired right hearing aid and have it programmed in [place]q with his left hearing aid.     Patient is on hospice and unable to come to office.       Aids programmed together.   N/C.        RTC as needed.

## 2025-06-27 NOTE — PROGRESS NOTES
HEARING AID FOLLOW-UP    Ebenezer Antonio  7/31/1945  [id number]    Patient is here for  of replacement c-shell and return of loaner hearing aid. Patient arrived 30 min after scheduled appt, so abbreviated appt was completed today.      In offi Topical Steroids Counseling: I discussed with the patient that prolonged use of topical steroids can result in the increased appearance of superficial blood vessels (telangiectasias), lightening (hypopigmentation) and thinning of the skin (atrophy).  Patient understands to avoid using high potency steroids in skin folds, the groin or the face.  The patient verbalized understanding of the proper use and possible adverse effects of topical steroids.  All of the patient's questions and concerns were addressed. Detail Level: Zone

## (undated) DEVICE — FORCEP RADIAL JAW 4

## (undated) DEVICE — MEDI-VAC NON-CONDUCTIVE SUCTION TUBING 6MM X 1.8M (6FT.) L: Brand: CARDINAL HEALTH

## (undated) DEVICE — ENDOSCOPY PACK UPPER: Brand: MEDLINE INDUSTRIES, INC.

## (undated) DEVICE — CLIP LGT 11MM OPEN 2.8MM 235CM

## (undated) DEVICE — Device: Brand: DEFENDO AIR/WATER/SUCTION AND BIOPSY VALVE

## (undated) DEVICE — LINE MNTR ADLT SET O2 INTMD

## (undated) DEVICE — Device: Brand: CUSTOM PROCEDURE KIT

## (undated) NOTE — ED AVS SNAPSHOT
[facility] Emergency Department    Jessica [address]     [place] [address]    Phone:  [phone]    Fax:  [phone]           Ebonie Merino   MRN: [id number]    Department:  [facility] Emergency Department   Date of Visit:  4/2 and Class Registration line at [phone] or find a doctor online by visiting [email or web address].    IF THERE IS ANY CHANGE OR WORSENING OF YOUR CONDITION, CALL YOUR PRIMARY CARE PHYSICIAN AT ONCE OR RETURN IMMEDIATELY TO [address] [place] [address].     If

## (undated) NOTE — MR AVS SNAPSHOT
Alysha  [address]  [phone]               Thank you for choosing us [facility].   We are glad to serve you and happy to provide you with this summar Take 20 mg by mouth.           lisinopril 10 MG Tabs   TAKE 1 TABLET(10 MG) BY MOUTH EVERY DAY   Commonly known as:  PRINIVIL,ZESTRIL           Metoprolol Succinate ER 50 MG Tb24   TAKE 1 TABLET BY MOUTH EVERY DAY   Commonly known as:   Toprol XL

## (undated) NOTE — ED AVS SNAPSHOT
Woo Barbosa   MRN: [id number]    Department:  [facility] Emergency Department   Date of Visit:  12/11/2019           Disclosure     Insurance plans vary and the physician(s) referred by the ER may not be covered by your plan.  Please contac within the next three months to obtain basic health screening including reassessment of your blood pressure.     IF THERE IS ANY CHANGE OR WORSENING OF YOUR CONDITION, CALL YOUR PRIMARY CARE PHYSICIAN AT ONCE OR RETURN IMMEDIATELY TO THE EMERGENCY DEPARTMEN

## (undated) NOTE — LETTER
10/21/2018              Lisa Marshall        [address] [place] [id number]         Dear Adam Rubio,    I reviewed the pathology report from the biopsies done during your recent upper endoscopy.  Based on the report, you have NO evidence of H.

## (undated) NOTE — MR AVS SNAPSHOT
Alysha  [address]  [phone]               Thank you for choosing us for your health care visit with Princess Hook MD.  We are glad to serve you and happy to provide you with this summ This list is accurate as of: 6/12/17 12:34 PM.  Always use your most recent med list.                aspirin 325 MG Tabs   Take 325 mg by mouth daily. atorvastatin 80 MG Tabs   Take 1 tablet (80 mg total) by mouth daily.    Commonly known as:  LIP Your unique gestigon Access Code: Dionte Ma  Expires: 6/22/2017  6:21 PM    If you have questions, you can call [phone] to talk to our [facility] Staff. Remember, gestigon is NOT to be used for urgent needs. For medical emergencies, dial 911.

## (undated) NOTE — LETTER
[address], [place]  Authorization for Invasive Procedures  1.  I hereby authorize Dr. Noel Collet , my physician and whomever may be designated as the doctor's assistant, to perform the following operation and/or procedure:  COMP 5. I consent to the photographing of the operations or procedures to be performed for the purposes of advancing medicine, science, and/or education, provided my identity is not revealed.  If the procedure has been videotaped, the physician/surgeon will obta __________ Time: ___________    Statement of Physician  My signature below affirms that prior to the time of the procedure, I have explained to the patient and/or his legal representative, the risks and benefits involved in the proposed treatment and any r

## (undated) NOTE — LETTER
October 27, 2017    Joe Rodriguez  [address]      Dear Loren Sharma:   It was a pleasure speaking with you over the phone recently regarding our Chronic Care Management program. To follow up, I wanted to send you some contact

## (undated) NOTE — LETTER
12/08/20        Amy Fuentes  [address] [place] [address]      Dear Nikita Gray,    Our records indicate that you have outstanding lab work and or testing that was ordered for you and has not yet been completed:     AFP, TUMOR MARKER, SER

## (undated) NOTE — MR AVS SNAPSHOT
[address] [address]  [phone]               Thank you for choosing us for your health care visit with Ilene Pratt MD.  We are glad to serve you and happy to provide you with this s Commonly known as:  EFUDEX           folic acid 1 MG Tabs   Take by mouth daily. What changed:  Another medication with the same name was removed. Continue taking this medication, and follow the directions you see here.    Commonly known as:  Google visit,  view other health information, and more. To sign up or find more information, go to [email or web address] and click on the Sign Up Now link in the [employer] box.      Enter your [employer] Activation Code exactly as it appears below along with yo Don’t forget strength training with weights and resistance Set goals and track your progress   You don’t need to join a gym. Home exercises work great.  Put more priority on exercise in your life                    Visit [facility] online at

## (undated) NOTE — ED AVS SNAPSHOT
[facility] in [place] [address]    Phone:  [address] Hermilo Salomon   MRN: [id number]    Department:  [facility] AND [facility] in [facility]   Date of Visit:  4/23/2017 deductible, co-payment, or co-insurance and for other services not covered under your health insurance plan. Please contact your insurance company and physician's office to determine coverage and benefits available for follow-up care and referrals.      It continue to take your medications as instructed by your Primary Care doctor until you can check with your doctor. Please bring the medication list to your next doctor's appointment.     Any imaging studies and labs completed today can be reviewed in your M can help with your Affordable Care Act coverage, as well as all types of Medicaid plans. To get signed up and covered, please call [phone] and ask to get set up for an insurance coverage that is in-network with Deon Caicedo

## (undated) NOTE — ED AVS SNAPSHOT
[facility] Emergency Department    Jessica [address]     [place] [address]    Phone:  [phone]    Fax:  [phone]           Estefany Chew   MRN: [id number]    Department:  [facility] Department   Date of Visit:  4/2 Bring a paper prescription for each of these medications    - RaNITidine HCl 150 MG Tabs              Discharge Instructions       Low fat diet, return for fever, vomiting or any concerning symptoms    Discharge References/Attachments     EPIGASTRIC PAIN and Class Registration line at [phone] or find a doctor online by visiting [email or web address].    IF THERE IS ANY CHANGE OR WORSENING OF YOUR CONDITION, CALL YOUR PRIMARY CARE PHYSICIAN AT ONCE OR RETURN IMMEDIATELY TO [address] [place] [address].     If you to explore options for quitting.     - If you have concerns related to behavioral health issues or thoughts of harming yourself, contact [facility] at [phone].     - If you don’t have insurance, Deon Garcia

## (undated) NOTE — LETTER
4/11/2019    Joselin Aj [facility] [address]            Dear Joselin Dueñas,      Our records indicate that you are due for an appointment for a Colonoscopy in May 2019, or shortly there after, with RAJIV Ernst

## (undated) NOTE — LETTER
8/17/2020    Umesh Kothari        [address] [place] [address]            Dear Umesh Kothari,      Our records indicate that you are due for an appointment for a EGD or Upper Endoscopy with Lev Patel MD.    Please call our

## (undated) NOTE — LETTER
10/11/2018              [facility] [address]         Please perfoirm bilateral hearing evaluation on patient.       Sincerely,        Helane Lundborg, MD  [place] [address] [address]

## (undated) NOTE — MR AVS SNAPSHOT
Alysha  [address]  [phone]               [facility] you for [facility]   We are glad to serve you and happy to provide you with this summar Take 20 mg by mouth.           lisinopril 10 MG Tabs   TAKE 1 TABLET(10 MG) BY MOUTH EVERY DAY   Commonly known as:  PRINIVIL,ZESTRIL           Metoprolol Succinate ER 50 MG Tb24   TAKE 1 TABLET BY MOUTH EVERY DAY   Commonly known as:   Toprol XL

## (undated) NOTE — LETTER
[address], [place]  Authorization for Invasive Procedures  1.  I hereby authorize Dr.A BARFIELD/ DR Akua Nelson , my physician and whomever may be designated as the doctor's assistant, to perform the following operation and/or proced 5. I consent to the photographing of the operations or procedures to be performed for the purposes of advancing medicine, science, and/or education, provided my identity is not revealed.  If the procedure has been videotaped, the physician/surgeon will obta __________ Time: ___________    Statement of Physician  My signature below affirms that prior to the time of the procedure, I have explained to the patient and/or his legal representative, the risks and benefits involved in the proposed treatment and any r

## (undated) NOTE — ED AVS SNAPSHOT
Joselin Dueñas   MRN: [id number]    Department:  [facility] Emergency Department   Date of Visit:  9/14/2018           Disclosure     Insurance plans vary and the physician(s) referred by the ER may not be covered by your plan.  Please contact within the next three months to obtain basic health screening including reassessment of your blood pressure.     IF THERE IS ANY CHANGE OR WORSENING OF YOUR CONDITION, CALL YOUR PRIMARY CARE PHYSICIAN AT ONCE OR RETURN IMMEDIATELY TO THE EMERGENCY DEPARTMEN